# Patient Record
Sex: FEMALE | Race: WHITE | Employment: FULL TIME | URBAN - METROPOLITAN AREA
[De-identification: names, ages, dates, MRNs, and addresses within clinical notes are randomized per-mention and may not be internally consistent; named-entity substitution may affect disease eponyms.]

---

## 2018-12-01 ENCOUNTER — HOSPITAL ENCOUNTER (OUTPATIENT)
Dept: MRI IMAGING | Age: 63
Discharge: HOME | End: 2018-12-03
Payer: COMMERCIAL

## 2018-12-01 DIAGNOSIS — M25.562 LEFT KNEE PAIN, UNSPECIFIED CHRONICITY: ICD-10-CM

## 2018-12-01 PROCEDURE — 73721 MRI JNT OF LWR EXTRE W/O DYE: CPT

## 2023-07-12 LAB
APPEARANCE, URINE: NORMAL
ASCORBIC ACID: NORMAL MG/DL
BILIRUBIN, URINE: NORMAL
BLOOD, URINE: NORMAL
COLOR, URINE: NORMAL
GLUCOSE, URINE: NORMAL
KETONES, URINE: NORMAL
LEUKOCYTE ESTERASE, URINE: NORMAL
NITRITE, URINE: NORMAL
PH, URINE: NORMAL
PROTEIN, URINE: NORMAL
SPECIFIC GRAVITY, URINE: NORMAL
URINE CULTURE: NORMAL
UROBILINOGEN, URINE: NORMAL

## 2023-07-14 LAB
APPEARANCE, URINE: CLEAR
BILIRUBIN, URINE: NEGATIVE
BLOOD, URINE: NEGATIVE
COLOR, URINE: YELLOW
GLUCOSE, URINE: NEGATIVE MG/DL
KETONES, URINE: NEGATIVE MG/DL
LEUKOCYTE ESTERASE, URINE: NEGATIVE
NITRITE, URINE: NEGATIVE
PH, URINE: 5 (ref 5–8)
PROTEIN, URINE: NEGATIVE MG/DL
SPECIFIC GRAVITY, URINE: 1.01 (ref 1–1.03)
UROBILINOGEN, URINE: <2 MG/DL (ref 0–1.9)

## 2023-07-16 LAB — URINE CULTURE: NO GROWTH

## 2023-07-19 LAB
EGFR FEMALE: 55 ML/MIN/1.73M2
POC CREATININE: 1.1 MG/DL (ref 0.6–1.3)

## 2023-08-15 LAB
ANION GAP IN SER/PLAS: 12 MMOL/L (ref 10–20)
ANION GAP SERPL CALCULATED.3IONS-SCNC: 12 MMOL/L (ref 10–20)
BICARBONATE: 28 MMOL/L (ref 21–32)
CALCIUM (MG/DL) IN SER/PLAS: 10.2 MG/DL (ref 8.6–10.3)
CALCIUM SERPL-MCNC: 10.2 MG/DL (ref 8.6–10.3)
CARBON DIOXIDE, TOTAL (MMOL/L) IN SER/PLAS: 28 MMOL/L (ref 21–32)
CHLORIDE (MMOL/L) IN SER/PLAS: 102 MMOL/L (ref 98–107)
CHLORIDE BLD-SCNC: 102 MMOL/L (ref 98–107)
CREAT SERPL-MCNC: 1.05 MG/DL (ref 0.5–1.05)
CREATININE (MG/DL) IN SER/PLAS: 1.05 MG/DL (ref 0.5–1.05)
EGFR FEMALE: 58 ML/MIN/1.73M2
ERYTHROCYTE DISTRIBUTION WIDTH (RATIO) BY AUTOMATED COUNT: 13.8 % (ref 11.5–14.5)
ERYTHROCYTE MEAN CORPUSCULAR HEMOGLOBIN CONCENTRATION (G/DL) BY AUTOMATED: 32 G/DL (ref 32–36)
ERYTHROCYTE MEAN CORPUSCULAR VOLUME (FL) BY AUTOMATED COUNT: 94 FL (ref 80–100)
ERYTHROCYTE [DISTWIDTH] IN BLOOD BY AUTOMATED COUNT: 13.8 % (ref 11.5–14.5)
ERYTHROCYTES (10*6/UL) IN BLOOD BY AUTOMATED COUNT: 4.1 X10E12/L (ref 4–5.2)
GFR FEMALE: 58 ML/MIN/1.73M2
GLUCOSE (MG/DL) IN SER/PLAS: 142 MG/DL (ref 74–99)
GLUCOSE: 142 MG/DL (ref 74–99)
HCT VFR BLD CALC: 38.4 % (ref 36–46)
HEMATOCRIT (%) IN BLOOD BY AUTOMATED COUNT: 38.4 % (ref 36–46)
HEMOGLOBIN (G/DL) IN BLOOD: 12.3 G/DL (ref 12–16)
HEMOGLOBIN: 12.3 G/DL (ref 12–16)
LEUKOCYTES (10*3/UL) IN BLOOD BY AUTOMATED COUNT: 6.5 X10E9/L (ref 4.4–11.3)
MCHC RBC AUTO-ENTMCNC: 32 G/DL (ref 32–36)
MCV RBC AUTO: 94 FL (ref 80–100)
PLATELET # BLD: 213 X10E9/L (ref 150–450)
PLATELETS (10*3/UL) IN BLOOD AUTOMATED COUNT: 213 X10E9/L (ref 150–450)
POTASSIUM (MMOL/L) IN SER/PLAS: 3.9 MMOL/L (ref 3.5–5.3)
POTASSIUM SERPL-SCNC: 3.9 MMOL/L (ref 3.5–5.3)
RBC # BLD: 4.1 X10E12/L (ref 4–5.2)
SODIUM (MMOL/L) IN SER/PLAS: 138 MMOL/L (ref 136–145)
SODIUM BLD-SCNC: 138 MMOL/L (ref 136–145)
UREA NITROGEN (MG/DL) IN SER/PLAS: 18 MG/DL (ref 6–23)
UREA NITROGEN: 18 MG/DL (ref 6–23)
WBC: 6.5 X10E9/L (ref 4.4–11.3)

## 2023-09-21 PROBLEM — I10 BENIGN ESSENTIAL HYPERTENSION: Status: ACTIVE | Noted: 2023-09-21

## 2023-09-21 PROBLEM — E11.9 DIABETES MELLITUS (MULTI): Status: ACTIVE | Noted: 2023-09-21

## 2023-09-21 PROBLEM — E83.42 HYPOMAGNESEMIA: Status: ACTIVE | Noted: 2023-09-21

## 2023-09-21 PROBLEM — R93.1 ABNORMAL COMPUTED TOMOGRAPHY ANGIOGRAPHY OF HEART: Status: ACTIVE | Noted: 2023-09-21

## 2023-09-21 PROBLEM — R06.02 SOB (SHORTNESS OF BREATH): Status: ACTIVE | Noted: 2023-09-21

## 2023-09-21 PROBLEM — R91.1 PULMONARY NODULE: Status: ACTIVE | Noted: 2023-09-21

## 2023-09-21 PROBLEM — R82.89 ABNORMAL URINE CYTOLOGY: Status: ACTIVE | Noted: 2023-09-21

## 2023-09-21 PROBLEM — N20.0 NEPHROLITHIASIS: Status: ACTIVE | Noted: 2023-09-21

## 2023-09-21 PROBLEM — R20.2 NUMBNESS AND TINGLING: Status: ACTIVE | Noted: 2023-09-21

## 2023-09-21 PROBLEM — M54.9 DORSODYNIA: Status: ACTIVE | Noted: 2023-09-21

## 2023-09-21 PROBLEM — M54.12 CERVICAL RADICULOPATHY: Status: ACTIVE | Noted: 2023-09-21

## 2023-09-21 PROBLEM — N39.46 URGE AND STRESS INCONTINENCE: Status: ACTIVE | Noted: 2023-09-21

## 2023-09-21 PROBLEM — I27.20 PULMONARY HTN (MULTI): Status: ACTIVE | Noted: 2023-09-21

## 2023-09-21 PROBLEM — R94.39 ABNORMAL CARDIOVASCULAR STRESS TEST: Status: ACTIVE | Noted: 2023-09-21

## 2023-09-21 PROBLEM — R07.9 CHEST PAIN: Status: ACTIVE | Noted: 2023-09-21

## 2023-09-21 PROBLEM — D30.00 BENIGN RENAL TUMOR: Status: ACTIVE | Noted: 2023-09-21

## 2023-09-21 PROBLEM — D17.9 ANGIOMYOLIPOMA: Status: ACTIVE | Noted: 2023-09-21

## 2023-09-21 PROBLEM — K58.9 IBS (IRRITABLE BOWEL SYNDROME): Status: ACTIVE | Noted: 2023-09-21

## 2023-09-21 PROBLEM — E66.01 MORBID OBESITY WITH BMI OF 40.0-44.9, ADULT (MULTI): Status: ACTIVE | Noted: 2023-09-21

## 2023-09-21 PROBLEM — R79.89 ELEVATED TROPONIN: Status: ACTIVE | Noted: 2023-09-21

## 2023-09-21 PROBLEM — I82.409 DVT (DEEP VENOUS THROMBOSIS) (MULTI): Status: ACTIVE | Noted: 2023-09-21

## 2023-09-21 PROBLEM — R20.0 NUMBNESS AND TINGLING: Status: ACTIVE | Noted: 2023-09-21

## 2023-09-21 RX ORDER — ALBUTEROL SULFATE 90 UG/1
2 AEROSOL, METERED RESPIRATORY (INHALATION) EVERY 4 HOURS PRN
COMMUNITY
Start: 2014-08-09

## 2023-09-21 RX ORDER — CHOLECALCIFEROL (VITAMIN D3) 25 MCG
1 TABLET ORAL DAILY
COMMUNITY

## 2023-09-21 RX ORDER — LOSARTAN POTASSIUM 50 MG/1
1 TABLET ORAL DAILY
COMMUNITY

## 2023-09-21 RX ORDER — CHOLECALCIFEROL (VITAMIN D3) 125 MCG
125 CAPSULE ORAL DAILY
COMMUNITY

## 2023-09-21 RX ORDER — NITROGLYCERIN 0.4 MG/1
0.4 TABLET SUBLINGUAL EVERY 5 MIN PRN
COMMUNITY

## 2023-09-21 RX ORDER — DICYCLOMINE HYDROCHLORIDE 10 MG/1
10 CAPSULE ORAL 4 TIMES DAILY PRN
COMMUNITY

## 2023-09-21 RX ORDER — ASCORBIC ACID 500 MG
1 TABLET ORAL DAILY
COMMUNITY

## 2023-09-21 RX ORDER — DULOXETIN HYDROCHLORIDE 30 MG/1
1 CAPSULE, DELAYED RELEASE ORAL DAILY
COMMUNITY
Start: 2021-06-17

## 2023-09-21 RX ORDER — LOSARTAN POTASSIUM AND HYDROCHLOROTHIAZIDE 12.5; 5 MG/1; MG/1
1 TABLET ORAL DAILY
COMMUNITY
Start: 2014-07-23

## 2023-09-21 RX ORDER — GABAPENTIN 300 MG/1
600 CAPSULE ORAL NIGHTLY
COMMUNITY

## 2023-09-21 RX ORDER — DULAGLUTIDE 1.5 MG/.5ML
1 INJECTION, SOLUTION SUBCUTANEOUS
COMMUNITY

## 2023-09-21 RX ORDER — HYDROCODONE BITARTRATE AND ACETAMINOPHEN 5; 300 MG/1; MG/1
1 TABLET ORAL EVERY 6 HOURS PRN
COMMUNITY

## 2023-09-21 RX ORDER — APIXABAN 5 MG (74)
1 KIT ORAL 2 TIMES DAILY
COMMUNITY
Start: 2020-07-15

## 2023-09-21 RX ORDER — PNV NO.95/FERROUS FUM/FOLIC AC 28MG-0.8MG
100 TABLET ORAL DAILY
COMMUNITY

## 2023-09-21 RX ORDER — TIRZEPATIDE 2.5 MG/.5ML
INJECTION, SOLUTION SUBCUTANEOUS
COMMUNITY

## 2023-09-21 RX ORDER — ACETAMINOPHEN 500 MG
50 TABLET ORAL DAILY
COMMUNITY

## 2023-09-21 RX ORDER — ALLOPURINOL 300 MG/1
1 TABLET ORAL DAILY
COMMUNITY
Start: 2015-06-29 | End: 2023-11-28 | Stop reason: SDUPTHER

## 2023-09-21 RX ORDER — MONTELUKAST SODIUM 4 MG/1
1 TABLET, CHEWABLE ORAL 2 TIMES DAILY PRN
COMMUNITY

## 2023-09-21 RX ORDER — MELOXICAM 7.5 MG/1
1 TABLET ORAL DAILY
COMMUNITY
Start: 2014-08-09

## 2023-09-21 RX ORDER — CYCLOBENZAPRINE HCL 10 MG
.5-1 TABLET ORAL NIGHTLY PRN
COMMUNITY
Start: 2020-08-12

## 2023-09-21 RX ORDER — ASPIRIN 81 MG/1
81 TABLET ORAL DAILY
COMMUNITY

## 2023-09-21 RX ORDER — METFORMIN HYDROCHLORIDE 500 MG/1
500 TABLET, EXTENDED RELEASE ORAL 2 TIMES DAILY
COMMUNITY

## 2023-09-22 ENCOUNTER — HOSPITAL ENCOUNTER (OUTPATIENT)
Dept: DATA CONVERSION | Facility: HOSPITAL | Age: 68
End: 2023-09-22
Attending: INTERNAL MEDICINE | Admitting: INTERNAL MEDICINE
Payer: MEDICARE

## 2023-09-22 DIAGNOSIS — Z86.718 PERSONAL HISTORY OF OTHER VENOUS THROMBOSIS AND EMBOLISM: ICD-10-CM

## 2023-09-22 DIAGNOSIS — I25.118 ATHEROSCLEROTIC HEART DISEASE OF NATIVE CORONARY ARTERY WITH OTHER FORMS OF ANGINA PECTORIS (CMS-HCC): ICD-10-CM

## 2023-09-22 DIAGNOSIS — I25.10 ATHEROSCLEROTIC HEART DISEASE OF NATIVE CORONARY ARTERY WITHOUT ANGINA PECTORIS: ICD-10-CM

## 2023-09-22 DIAGNOSIS — E11.9 TYPE 2 DIABETES MELLITUS WITHOUT COMPLICATIONS (MULTI): ICD-10-CM

## 2023-09-22 DIAGNOSIS — K58.9 IRRITABLE BOWEL SYNDROME WITHOUT DIARRHEA: ICD-10-CM

## 2023-09-22 DIAGNOSIS — R07.9 CHEST PAIN, UNSPECIFIED: ICD-10-CM

## 2023-09-22 DIAGNOSIS — I10 ESSENTIAL (PRIMARY) HYPERTENSION: ICD-10-CM

## 2023-09-22 DIAGNOSIS — E66.9 OBESITY, UNSPECIFIED: ICD-10-CM

## 2023-09-22 DIAGNOSIS — R93.1 ABNORMAL FINDINGS ON DIAGNOSTIC IMAGING OF HEART AND CORONARY CIRCULATION: ICD-10-CM

## 2023-09-22 LAB
ACTIVATED PARTIAL THROMBOPLASTIN TIME IN PPP BY COAGULATION ASSAY: 46 SEC (ref 27–38)
ALANINE AMINOTRANSFERASE (SGPT) (U/L) IN SER/PLAS: 21 U/L (ref 7–45)
ALBUMIN (G/DL) IN SER/PLAS: 3.7 G/DL (ref 3.4–5)
ALKALINE PHOSPHATASE (U/L) IN SER/PLAS: 61 U/L (ref 33–136)
ANION GAP IN SER/PLAS: 11 MMOL/L (ref 10–20)
ANION GAP IN SER/PLAS: 13 MMOL/L (ref 10–20)
ANION GAP SERPL CALCULATED.3IONS-SCNC: 13 MMOL/L (ref 10–20)
ASPARTATE AMINOTRANSFERASE (SGOT) (U/L) IN SER/PLAS: 20 U/L (ref 9–39)
BICARBONATE: 23 MMOL/L (ref 21–32)
BILIRUBIN TOTAL (MG/DL) IN SER/PLAS: 0.6 MG/DL (ref 0–1.2)
CALCIUM (MG/DL) IN SER/PLAS: 10 MG/DL (ref 8.6–10.3)
CALCIUM (MG/DL) IN SER/PLAS: 9.6 MG/DL (ref 8.6–10.3)
CALCIUM SERPL-MCNC: 10 MG/DL (ref 8.6–10.3)
CARBON DIOXIDE, TOTAL (MMOL/L) IN SER/PLAS: 23 MMOL/L (ref 21–32)
CARBON DIOXIDE, TOTAL (MMOL/L) IN SER/PLAS: 25 MMOL/L (ref 21–32)
CHLORIDE (MMOL/L) IN SER/PLAS: 103 MMOL/L (ref 98–107)
CHLORIDE (MMOL/L) IN SER/PLAS: 105 MMOL/L (ref 98–107)
CHLORIDE BLD-SCNC: 103 MMOL/L (ref 98–107)
CHOLESTEROL (MG/DL) IN SER/PLAS: 112 MG/DL (ref 0–199)
CHOLESTEROL IN HDL (MG/DL) IN SER/PLAS: 39 MG/DL
CHOLESTEROL/HDL RATIO: 2.9
CREAT SERPL-MCNC: 1.14 MG/DL (ref 0.5–1.05)
CREATININE (MG/DL) IN SER/PLAS: 1.07 MG/DL (ref 0.5–1.05)
CREATININE (MG/DL) IN SER/PLAS: 1.14 MG/DL (ref 0.5–1.05)
EGFR FEMALE: 53 ML/MIN/1.73M2
ERYTHROCYTE DISTRIBUTION WIDTH (RATIO) BY AUTOMATED COUNT: 14 % (ref 11.5–14.5)
ERYTHROCYTE DISTRIBUTION WIDTH (RATIO) BY AUTOMATED COUNT: 14 % (ref 11.5–14.5)
ERYTHROCYTE MEAN CORPUSCULAR HEMOGLOBIN CONCENTRATION (G/DL) BY AUTOMATED: 33.1 G/DL (ref 32–36)
ERYTHROCYTE MEAN CORPUSCULAR HEMOGLOBIN CONCENTRATION (G/DL) BY AUTOMATED: 33.6 G/DL (ref 32–36)
ERYTHROCYTE MEAN CORPUSCULAR VOLUME (FL) BY AUTOMATED COUNT: 90 FL (ref 80–100)
ERYTHROCYTE MEAN CORPUSCULAR VOLUME (FL) BY AUTOMATED COUNT: 91 FL (ref 80–100)
ERYTHROCYTE [DISTWIDTH] IN BLOOD BY AUTOMATED COUNT: 14 % (ref 11.5–14.5)
ERYTHROCYTES (10*6/UL) IN BLOOD BY AUTOMATED COUNT: 3.94 X10E12/L (ref 4–5.2)
ERYTHROCYTES (10*6/UL) IN BLOOD BY AUTOMATED COUNT: 4.27 X10E12/L (ref 4–5.2)
ESTIMATED AVERAGE GLUCOSE FOR HBA1C: 157 MG/DL
GFR FEMALE: 53 ML/MIN/1.73M2
GFR FEMALE: 57 ML/MIN/1.73M2
GLUCOSE (MG/DL) IN SER/PLAS: 129 MG/DL (ref 74–99)
GLUCOSE (MG/DL) IN SER/PLAS: 133 MG/DL (ref 74–99)
GLUCOSE: 133 MG/DL (ref 74–99)
HCT VFR BLD CALC: 38.4 % (ref 36–46)
HEMATOCRIT (%) IN BLOOD BY AUTOMATED COUNT: 35.9 % (ref 36–46)
HEMATOCRIT (%) IN BLOOD BY AUTOMATED COUNT: 38.4 % (ref 36–46)
HEMOGLOBIN (G/DL) IN BLOOD: 11.9 G/DL (ref 12–16)
HEMOGLOBIN (G/DL) IN BLOOD: 12.9 G/DL (ref 12–16)
HEMOGLOBIN A1C/HEMOGLOBIN TOTAL IN BLOOD: 7.1 %
HEMOGLOBIN: 12.9 G/DL (ref 12–16)
INR IN PPP BY COAGULATION ASSAY: 1.1 (ref 0.9–1.1)
LDL: 39 MG/DL (ref 0–99)
LEUKOCYTES (10*3/UL) IN BLOOD BY AUTOMATED COUNT: 4.6 X10E9/L (ref 4.4–11.3)
LEUKOCYTES (10*3/UL) IN BLOOD BY AUTOMATED COUNT: 5 X10E9/L (ref 4.4–11.3)
MAGNESIUM (MG/DL) IN SER/PLAS: 1.59 MG/DL (ref 1.6–2.4)
MCHC RBC AUTO-ENTMCNC: 33.6 G/DL (ref 32–36)
MCV RBC AUTO: 90 FL (ref 80–100)
PLATELET # BLD: 205 X10E9/L (ref 150–450)
PLATELETS (10*3/UL) IN BLOOD AUTOMATED COUNT: 188 X10E9/L (ref 150–450)
PLATELETS (10*3/UL) IN BLOOD AUTOMATED COUNT: 205 X10E9/L (ref 150–450)
POTASSIUM (MMOL/L) IN SER/PLAS: 3.6 MMOL/L (ref 3.5–5.3)
POTASSIUM (MMOL/L) IN SER/PLAS: 3.7 MMOL/L (ref 3.5–5.3)
POTASSIUM SERPL-SCNC: 3.7 MMOL/L (ref 3.5–5.3)
PROTEIN TOTAL: 6.6 G/DL (ref 6.4–8.2)
PROTHROMBIN TIME (PT) IN PPP BY COAGULATION ASSAY: 12.1 SEC (ref 9.8–12.8)
RBC # BLD: 4.27 X10E12/L (ref 4–5.2)
SODIUM (MMOL/L) IN SER/PLAS: 135 MMOL/L (ref 136–145)
SODIUM (MMOL/L) IN SER/PLAS: 137 MMOL/L (ref 136–145)
SODIUM BLD-SCNC: 135 MMOL/L (ref 136–145)
TRIGLYCERIDE (MG/DL) IN SER/PLAS: 170 MG/DL (ref 0–149)
UREA NITROGEN (MG/DL) IN SER/PLAS: 25 MG/DL (ref 6–23)
UREA NITROGEN (MG/DL) IN SER/PLAS: 28 MG/DL (ref 6–23)
UREA NITROGEN: 28 MG/DL (ref 6–23)
VLDL: 34 MG/DL (ref 0–40)
WBC: 5 X10E9/L (ref 4.4–11.3)

## 2023-09-24 LAB — STAPH/MRSA SCREEN, CULTURE: NORMAL

## 2023-09-29 VITALS — BODY MASS INDEX: 40.76 KG/M2 | WEIGHT: 259.7 LBS | HEIGHT: 67 IN

## 2023-09-30 NOTE — H&P
History of Present Illness:   Admission Reason: Abnormal nuclear stress test/chest  pain/dyspnea   HPI:    GARY TRIANA is a 68 year old Female with past medical history significant for HTN, DM 2, morbid obesity, DVT, irritable bowel syndrome, pulmonary hypertension with  recent complaints of chest pain and shortness of breath with minimal exertion.  Patient underwent nuclear stress test which was abnormal showing lateral ischemia.  It was recommended she undergo LHC/possible PCI to further evaluate abnormal findings.   She is at [facility] today for this procedure under the care of Dr. Hernandez.    Patient denies complaints of recent illness, fevers, chills, sweats or exposure to COVID-19.  Denies recent complaints of lightheadedness, dizziness, chest pain, shortness of breath or palpitations at rest.  She does complain of chest pain and shortness  of breath with exertion.  Denies complaints of nausea, vomiting, diarrhea or constipation.  Denies complaints of lower extremity edema or weakness.    Comorbidities:   Comorbidites:  ·  Comorbid Conditions diabetes, hypertension   ·  Diabetes Type Type 2   ·  Insulin Dependent no   ·  DM Acuity or Status unknown   ·  DM Complications unknown     Past Medical/Surgical History:        Medical History:   Cervical radiculopathy, chronic:    IBS (irritable bowel syndrome):    Nephrolithiasis, uric acid:    Angiomyolipoma:    Chronic edema:    Diabetes mellitus:    Hyperlipidemia:    Hypertension:        Surg History:   History of laparoscopic cholecystectomy:    History of  section:    History of knee replacement:     Family History:   CAD: yes  Father, mother   Hypertension: yes  Brother   Stroke: yes  Mother     Social History:   Social History:  Smoking Status never smoker   Alcohol Use denies   Drug Use denies   Social History                 Allergies:  ·  lisinopril : Other    Medications Prior to Admission:   Admission Medication  Reconciliation has not been completed for this patient.    Review of Systems:   Constitutional: NEGATIVE: Fever, Chills, Anorexia,  Weight Loss, Malaise     Eyes: NEGATIVE: Blurry Vision, Drainage, Diploplia,  Redness, Vision Loss/ Change     ENMT: NEGATIVE: Nasal Discharge, Nasal Congestion,  Ear Pain, Mouth Pain, Throat Pain     Respiratory: POSITIVE: Shortness of Breath; NEGATIVE:  Dry Cough, Productive Cough, Hemoptysis, Wheezing     Cardiac: POSITIVE: Chest Pain, Dyspnea on Exertion ; NEGATIVE: Orthopnea, Palpitations, Syncope     Gastrointestinal: NEGATIVE: Nausea, Vomiting, Diarrhea,  Constipation, Abdominal Pain     Musculoskeletal: NEGATIVE: Decreased ROM, Pain, Swelling,  Stiffness, Weakness     Neurological: NEGATIVE: Dizziness, Confusion, Headache,  Seizures, Syncope     Skin: NEGATIVE: Mass, Pain, Pruritus, Rash, Ulcer     All Other Systems: All other systems reviewed and  are negative     Objective:     Objective Information:    Vital signs reviewed and are stable.       Weights   9/22 7:13: Weight in kg (Weight (kg))  117.8  9/22 7:13: Weight in lbs ((lbs))  259.7  9/22 7:13: BMI (kg/m2) (BMI (kg/m2))  40.713    Physical Exam by System:    Constitutional: Well developed, awake/alert/oriented  x3, no distress, alert and cooperative   Eyes: PERRL, EOMI, clear sclera   ENMT: mucous membranes moist, no apparent injury,  no lesions seen   Head/Neck: Neck supple, no apparent injury, thyroid  without mass or tenderness, No JVD, trachea midline, no bruits   Respiratory/Thorax: Patent airways, CTAB, normal  breath sounds with good chest expansion, thorax symmetric   Cardiovascular: Regular, rate and rhythm, no murmurs,  2+ equal pulses of the extremities, normal S 1and S 2   Gastrointestinal: Nondistended, soft, non-tender,  no rebound tenderness or guarding, no masses palpable, no organomegaly, +BS, no bruits   Genitourinary: Deferred   Musculoskeletal: ROM intact, no joint swelling, normal  strength    Extremities: normal extremities, no edema, contusions  or wounds, no clubbing   Neurological: alert and oriented x3, intact senses,  motor, response and reflexes, normal strength   Breast: Deferred   Psychological: Appropriate mood and behavior   Skin: Warm and dry, no lesions, no rashes     Medications:    Medications:    I have reviewed active medication orders.    Recent Lab Results:    Results:        I have reviewed these laboratory results:    Complete Blood Count  22-Sep-2023 07:30:00      Result Value    White Blood Cell Count  5.0    Red Blood Cell Count  4.27    HGB  12.9    HCT  38.4    MCV  90    MCHC  33.6    PLT  205    RDW-CV  14.0      Basic Metabolic Panel  22-Sep-2023 07:30:00      Result Value    Glucose, Serum  133   H   NA  135   L   K  3.7    CL  103    Bicarbonate, Serum  23    Anion Gap, Serum  13    BUN  28   H   CREAT  1.14   H   GFR Female  53   A   Calcium, Serum  10.0        Radiology Results:    Results:        Impression:  Old calcified granulomatous disease of the chest.     4-5 mm right middle lobe nodule with equivocal central calcification.  There is a 3 x 5 mm subpleural density in the right middle lobe  without calcification. There are 2 punctate densities in the left  lung. These findings are stable from a CT examination of 07/31/2020  favoring benign etiology.     The remaining extra vascular structures are unremarkable.       Addendum Ends  MRN: [id number]  Patient Name: GARY TRIANA     STUDY:  CTA CORONARY ART WITH HEARTFLOW IF SCORE >30%.;  9/12/2023 10:33 am     INDICATION:  Chest pain dyspnea  R06.02: SOB (shortness of breath) R07.9: Chest  pain.     COMPARISON:  None.     ACCESSION NUMBER(S):  [id number]     ORDERING CLINICIAN:  EMMA ANGELA     TECHNIQUE:  Using multi-detector CT technology, Ruth 64-slice scanner, axial,  sequential imaging with retrospective gating was performed of the  chest following the intravenous administration of contrast material.  A  low-osmolar contrast agent was used 75 ml of Omnipaque 350.  Using prospective ECG gating, CT scan of the coronary arteries was  performed without intravenous contrast. Coronary calcium scoring was  performed according to the method of Agatston.     In addition, CT-FFR analysis was also performed.      The patient was premedicated with atenolol and 0.4 mg sublingual  nitroglycerin per protocol for heart rate control and coronary  dilation, respectively.     For optimization of anatomic evaluation, multiplanar reconstruction,  maximum intensity projections, and advanced 3-D off-line  postprocessing were performed on a dedicated stand-alone workstation  under the direct supervision of the interpreting physician.     Independent FFR analysis of Heartflow 3D model was performed by  interpreting physician.        CT Dose-Length Product (DLP):  1105.2 mGy/cm  CT Dose Reduction Employed: Yes iterative reconstruction     FINDINGS:  The left main is normal sized vessel that  bifurcates into the LAD  and circumflex.     Distal calcified plaque without significant stenosis.     LEFT ANTERIOR DESCENDING ARTERY:  The LAD is a normal size vessel that  wraps around the apex.  Proximal mixed plaque with 50% stenosis. Proximal FFR 0.96  (hemodynamically nonsignificant). Mid vessel noncalcified plaque with  significant stenosis. Mid LAD FFR 0.57 (hemodynamically significant.  LAD gives rise to 1 acute diagonal branches.  D1: Proximal calcified plaque without significant stenosis. Mid  vessel mixed plaque with 50% stenosis.     LEFT CIRCUMFLEX ARTERY:  The LCfx is a normal size vessel, which is  non-dominant.  Proximal calcified plaque without significant stenosis.  LCfx gives rise to 2 obtuse marginal branches.  OM1 proximal noncalcified plaque without significant stenosis.  OM2 proximal mixed plaque with 50% stenosis. Mid vessel mixed plaque  with significant stenosis. Mid OM2 FFR 0.61 (hemodynamically  significant).     RIGHT  CORONARY ARTERY:  The RCA is a normal size vessel, which is  dominant .     It gives rise to a conus branch, sixto branch, and 1 acute marginal  branches.  In its distal segment it bifurcates into the PDA and PV  branch.     Proximal mixed plaque without significant stenosis. Mid vessel  noncalcified plaque with 50% stenosis. Mid RCA FFR 0.86  (hemodynamically nonsignificant). Distal mixed plaque with 50-70%  stenosis. Distal FFR 0.69 (hemodynamically significant.     Right PDA proximal mixed plaque without significant stenosis.  Right PLV proximal noncalcified plaque without significant stenosis     Coronary artery calcium score 510, 95th percentile for age, gender,  and race in asymptomatic patients.     CARDIAC CHAMBERS:  The cardiac chambers demonstrate normal atrioventricular and  ventriculoarterial concordance, and systemic and pulmonary venous  return.     LEFT VENTRICLE:  Normal size End diastolic volume 157 ml, 65 ml/m2     LEFT VENTRICLE MASS:  136 gm, 57 gm/m2     RIGHT VENTRICLE:  Normal size End diastolic volume 159 ml, 66 ml/m2     LEFT ATRIUM:  Normal size End systolic volume 108 ml, 45 ml/m2     RIGHT ATRIUM:  Normal size End systolic volume 134 ml, 56 ml/m2     INTERATRIAL SEPTUM:  Intact.     AORTIC VALVE:  The aortic valve is  trileaflet in morphology. No calcifications.     MITRAL VALVE:  No thickening/calcification.     THORACICAORTA:  The visualized thoracic aorta is normal in course, caliber, and  contour.     There is no acute aortic pathology, such as dissection, intramural  hematoma, or contained rupture.     The aortic arch is not included on this examination.     PERICARDIUM:  There is no pericardial effusion of thickening.        FRACTIONAL FLOW RESERVE     LEFT ANTERIOR DESCENDING: Proximal 0.96, mid 0.57, distal 0.52  D1: Proximal 0.92     LEFT CIRCUMFLEX: Proximal 1.0.     OM2: Proximal 0.99, mid 0.61     RIGHT CORONARY ARTERY: Proximal 0.98, mid 0.86, distal 0.69  PDA: 0.68  PLV:  0.69        IMPRESSION:  1. Significant stenosis of the mid left anterior descending coronary  artery with noncalcified plaque. FFR 0.57 (hemodynamically  significant).  2. Significant stenosis of mid OM2 with mixed plaque. FFR 0.61  (hemodynamically significant).  3. Distal dominant right coronary artery mixed plaque with 50-70%  stenosis. Distal RCA FFR 0.69 (hemodynamically significant).  4. Mild-moderate diffuse coronaryartery disease of the remaining  coronary arteries without significant stenosis.  5. Coronary artery calcium score 510, 95th percentile for age,  gender, and race in asymptomatic patients.     Reading Cardiologist: Dr. Tony Gardner, Date:  9/12/2023  12:39 pm        ADDENDUM:  Technical: The following is to serve as an over-read for a  contrast-enhanced cardiac CT, to evaluate the extra vascular  structures.     Contiguous axial CT sections are performed from the level of the  shanel to the upper abdomen following the bolus administration of 80  cc of intravenous Omnipaque 350.           Findings: There is a 9 mm calcified granuloma in the right middle  lobe. There is an adjacent scratch at nodule measuring 4-5 mm in the  right middle lobe (image 40). Faint central calcification can not be  excluded. There are least 2 punctate densities in the left lower  lobe. There is a 5 x 3 mm subpleural density anteriorly in the right  middle lobe.     There are calcified right hilar lymph nodes. There is no sign of  pathologic lymph node enlargement. There is no pericardial or pleural  effusion.     Images through the upper abdomen are unremarkable aside from  calcified granuloma in the spleen..     The visualized osseous and soft tissue structures of the chest wall  are intact.           Impression: Old calcified granulomatous disease of the chest.     4-5 mm right middle lobe nodule with equivocal central calcification.  There is a 3 x 5 mm subpleural density in the right middle lobe  without  calcification. There are 2 punctate densities in the left  lung. These findings are stable from a CT examination of 07/31/2020  favoring benign etiology.     The remaining extra vascular structures are unremarkable.        CTA Coronary Arteries with Heartflow if Score >30% [Sep 12 2023 12:58PM]      Impression:    Abnormal Lexiscan Myoview cardiac perfusion stress test.  No evidence of ischemia or myocardial infarction by perfusion imaging.  Moderate lateral wall fixed perfusion defect with normal wall motion  suggesting attenuation artifact.  Normal left ventricular systolic function, ejection fraction 54%.  Abnormal resting electrocardiogram is noted.  No comparison study available.  Clinical correlation is advised.        NM Cardiac Stress/Rest Nuclear Med Order [Aug  8 2023  1:48PM]      Assessment and Plan:     Impression 1: Abnormal nuclear stress test/chest  pain/dyspnea   Plan for Impression 1: Showing lateral ischemia.    LHC/possible PCI today under the care of Dr. Hernandez   Impression 2: Benign essential hypertension   Plan for Impression 2: Stable   Impression 3: Diabetes mellitus 2   Impression 4: Morbid obesity   Plan for Impression 4: BMI 42.1   Impression 5: Pulmonary hypertension   Impression 6: History of DVT       Electronic Signatures:  Shelby De La Cruz (APRN-CNP)  (Signed 22-Sep-2023 08:28)   Authored: History of Present Illness, Comorbidities,  Past Medical/Surgical History, Family History, Social History, Allergies, Medications Prior to Admission, Review of Systems, Objective, Assessment and Plan, Note Completion      Last Updated: 22-Sep-2023 08:28 by Shelby De La Cruz (APRN-CNP)

## 2023-09-30 NOTE — H&P
History & Physical Reviewed:   I have reviewed the History and Physical dated:  22-Sep-2023   History and Physical reviewed and relevant findings noted. Patient examined to review pertinent physical  findings.: No significant changes   Home Medications Reviewed: no changes noted   Allergies Reviewed: no changes noted       Airway/Sedation Assessment:  ·  Oropharyngeal Classification Class II   ·  ASA PS Classification ASA II   ·  Sedation Plan moderate sedation       ERAS (Enhanced Recovery After Surgery):  ·  ERAS Patient: no     Consent:   COVID-19 Consent:  ·  COVID-19 Risk Consent Surgeon has reviewed key risks related to the risk of jayashree COVID-19 and if they contract COVID-19 what the risks are.       Electronic Signatures:  Nathan Hernandez)  (Signed 22-Sep-2023 11:26)   Authored: History & Physical Reviewed, Airway/Sedation,  ERAS, Consent, Note Completion      Last Updated: 22-Sep-2023 11:26 by Nathan Hernandez)

## 2023-10-02 LAB
ATRIAL RATE: 64 BPM
P AXIS: 38 DEGREES
P OFFSET: 181 MS
P ONSET: 121 MS
PR INTERVAL: 194 MS
Q ONSET: 218 MS
QRS COUNT: 10 BEATS
QRS DURATION: 86 MS
QT INTERVAL: 432 MS
QTC CALCULATION(BAZETT): 445 MS
QTC FREDERICIA: 441 MS
R AXIS: 33 DEGREES
T AXIS: 13 DEGREES
T OFFSET: 434 MS
VENTRICULAR RATE: 64 BPM

## 2023-10-03 ENCOUNTER — APPOINTMENT (OUTPATIENT)
Dept: CARDIOLOGY | Facility: CLINIC | Age: 68
End: 2023-10-03
Payer: MEDICARE

## 2023-10-23 ENCOUNTER — APPOINTMENT (OUTPATIENT)
Dept: CARDIOLOGY | Facility: CLINIC | Age: 68
End: 2023-10-23
Payer: MEDICARE

## 2023-11-28 DIAGNOSIS — N20.0 NEPHROLITHIASIS: Primary | ICD-10-CM

## 2023-11-28 RX ORDER — ALLOPURINOL 300 MG/1
300 TABLET ORAL DAILY
Qty: 90 TABLET | Refills: 3 | Status: SHIPPED | OUTPATIENT
Start: 2023-11-28

## 2024-03-06 NOTE — CONSULTS
"    Service:   Service: Cardiac Surgery     Consult:  Consult requested by (Attending Name): Nathan Hernandez   Reason: Triple Vessel CAD/Dr. Hernandez recommending  CABG     History of Present Illness:   Admission Reason: elective [facility]   HPI:    GARY TRIANA is a 68 year old Female with history of type 2 diabetes mellitus, hypertension, hyperlipidemia, chronic nephrolithiasis, venous reflux left lower extremity,  mild BLE lymphedema, bilateral DVT and PE now off anticoagulation for 1 year.  Approximately 2 months ago began to develop symptoms of exertional chest pain and dyspnea that was similar to the symptoms she experienced at the time of her PE.  Subsequent  CTA of the chest negative for PE, and was sent for a perfusion study which was notable for lateral ischemia.  She presented on 9/22 for elective left heart catheterization for further evaluation and was found to have significant multivessel coronary artery  disease with normal left main trunk, and 99% subtotal occlusion of the mid circumflex, and 90% stenosis of the proximal LAD, a 75% stenosis of the ostial first diagonal, and a 90% stenosis of the distal LAD.  Left ventriculography showing normal LV systolic  function.  Cardiac surgery has been consulted for surgical revascularization.    At time of consultation, the patient is in no distress.  She is afebrile, hemodynamically stable and maintaining sinus rhythm, saturations are adequate on room air.  No symptoms of resting dyspnea or angina.  She states that typically she experiences  these symptoms mostly when doing more strenuous activities, and symptoms are relieved with rest.  She did on 1 occasion take a sublingual nitroglycerin but has not used it since that time due to a \"head rush\" sensation.  Findings have been reviewed with  Dr. Castaneda, plan to obtain routine preoperative testing today.  Patient's symptoms consistent with stable angina, she is comfortable with being discharged to return electively " early next week for surgery.  Tentative date 9/26.    Review Family/Social History and ROS:     Review Family/Social History and ROS:       I have reviewed the family and social history and review of systems from the History and Physical.    Social History:    Smoking Status: never smoker  (1)   Alcohol Use: denies (1)   Drug Use: denies  (1)     Constitutional: NEGATIVE: Fever, Chills, Anorexia,  Weight Loss, Malaise     Eyes: NEGATIVE: Blurry Vision, Drainage, Diploplia,  Redness, Vision Loss/ Change     ENMT: NEGATIVE: Nasal Discharge, Nasal Congestion,  Ear Pain, Mouth Pain, Throat Pain     Respiratory: NEGATIVE: Dry Cough, Productive Cough,  Hemoptysis, Wheezing, Shortness of Breath     Cardiac: POSITIVE: Chest Pain, Dyspnea on Exertion ; NEGATIVE: Orthopnea, Palpitations, Syncope     Gastrointestinal: NEGATIVE: Nausea, Vomiting, Diarrhea,  Constipation, Abdominal Pain     Genitourinary: NEGATIVE: Discharge, Dysuria, Flank  Pain, Frequency, Hematuria     Musculoskeletal: POSITIVE: Swelling, Stiffness; NEGATIVE:  Decreased ROM, Pain, Weakness; COMMENTS: Mild BLE lymphedema     Neurological: NEGATIVE: Dizziness, Confusion, Headache,  Seizures, Syncope     Psychiatric: NEGATIVE: Mood Changes, Anxiety, Hallucinations,  Sleep Changes, Suicidal Ideas     Skin: NEGATIVE: Mass, Pain, Pruritus, Rash, Ulcer     Endocrine: NEGATIVE: Heat Intolerance, Cold Intolerance,  Sweat, Polyuria, Thirst     Hematologic/Lymph: NEGATIVE: Anemia, Bruising, Easy  Bleeding, Night Sweats, Petechiae     Allergic/Immunologic: NEGATIVE: Anaphylaxis, Itchy/  Teary Eyes, Itching, Sneezing, Swelling     Breast: NEGATIVE: Pain, Mass, Discharge, Nipple Itching,  Gynecomastia     All Other Systems: All other systems reviewed and  are negative            Allergies:  ·  lisinopril : Other    Objective:   Physical Exam by System:    Constitutional: Well developed, awake/alert/oriented  x3, no distress, alert and cooperative   Eyes: PERRL, EOMI, clear  sclera   ENMT: mucous membranes moist, no apparent injury,  no lesions seen   Head/Neck: Neck supple, no apparent injury, thyroid  without mass or tenderness, No JVD, trachea midline, no bruits   Respiratory/Thorax: Patent airways, CTAB, normal  breath sounds with good chest expansion, thorax symmetric   Cardiovascular: Regular, rate and rhythm, no murmurs,  2+ equal pulses of the extremities, normal S 1and S 2   Gastrointestinal: Nondistended, soft, non-tender,  no rebound tenderness or guarding, no masses palpable, no organomegaly, +BS, no bruits   Genitourinary: deferred   Musculoskeletal: Status post bilateral TKR otherwise  no deformity   Extremities: Chronic bilateral lower extremity lymphedema,  extremities well perfused   Neurological: alert and oriented x3, intact senses,  motor, response and reflexes, normal strength   Breast: deferred   Lymphatic: No significant lymphadenopathy   Psychological: Appropriate mood and behavior   Skin: Warm and dry, no lesions, no rashes     Medications:    Medications:          Continuous Medications       --------------------------------    1. Sodium Chloride 0.9% Infusion:  1000  mL  IntraVenous  <Continuous>         Scheduled Medications       --------------------------------  No scheduled medications are active         PRN Medications       --------------------------------    1. Acetaminophen:  650  mg  Oral  Every 6 Hours    2. Nitroglycerin SubLingual:  0.4  mg  SubLingual  Every 5 Minutes         Conditional Medication Orders       --------------------------------    1. Perflutren Lipid Microsphere (Activated) 1.3 mL / NaCL 0.9% T.V. 10 mL Injectable:  0.5  mL  IntraVenous Push  Once      Recent Lab Results:    Results:        I have reviewed these laboratory results:    Complete Blood Count  Trending View      Result 22-Sep-2023 14:17:00  22-Sep-2023 07:30:00    White Blood Cell Count 4.6   5.0    Red Blood Cell Count 3.94   L   4.27    HGB 11.9   L   12.9    HCT  35.9   L   38.4    MCV 91   90    MCHC 33.1   33.6       205    RDW-CV 14.0   14.0        Comprehensive Metabolic Panel  22-Sep-2023 14:17:00      Result Value    Glucose, Serum  129   H   NA  137    K  3.6    CL  105    Bicarbonate, Serum  25    Anion Gap, Serum  11    BUN  25   H   CREAT  1.07   H   GFR Female  57   A   Calcium, Serum  9.6    ALB  3.7    ALKP  61    T Pro  6.6    T Bili  0.6    Alanine Aminotransferase, Serum  21    Aspartate Transaminase, Serum  20      Coagulation Screen  22-Sep-2023 14:17:00      Result Value    Prothrombin Time, Plasma  12.1    International Normalized Ratio, Plasma  1.1    Activated Partial Thromboplastin Time  46   H     Lipid Panel  22-Sep-2023 14:17:00      Result Value    Cholesterol, Serum  112 .    AGE      DESIRABLE   BORDERLINE HIGH   HIGH   0-19 Y     0 - 169       170 - 199     >/= 200  20-24 Y     0 - 189       190 - 224     >/= 225        >24 Y     0 - 199       200 - 239     >/= 240 **All ranges are based on fasting samp    HDL Cholesterol, Serum  39.0 .    AGE      VERY LOW   LOW     NORMAL    HIGH     0-19 Y       < 35   < 40     40-45     ----  20-24 Y       ----   < 40       >45     ----    >24  Y       ----   < 40     40-60      >60.   A   Cholesterol/HDL Ratio  2.9 REF VALUESDESIRABLE  < 3.4HIGH RISK  > 5.0    LDL, Level  39 .                         NEAR      BORD    AGE      DESIRABLE  OPTIMAL    HIGH     HIGH     VERY HIGH   0-19 Y     0 - 109     ---    110-129   >/= 130      ----  20-24 Y     0 - 119     ---    120-159   >/= 160     ----    >24 Y     0 -    VLDL, Serum  34    Triglycerides, Serum  170 .    AGE      DESIRABLE   BORDERLINE HIGH   HIGH     VERY HIGH 0 D-90 D    19 - 174         ----         ----        ----91 D- 9 Y     0 -  74         75 -  99     >/= 100      ----  10-19 Y     0 -  89        90 - 129     >/= 130      ----   H     Magnesium, Serum  22-Sep-2023 14:17:00      Result Value    Magnesium, Serum  1.59   L        Radiology Results:    Results:        Conclusion:  CONCLUSIONS:  1. Left Main Coronary Artery: This artery is normal.  2. Left Anterior Descending Artery: is significantly obstructed.  3. Mid LAD Lesion: The percent stenosis is 90%.  4. 1st Diag LAD Lesion: The percent stenosis is 75%.  5. Circumflex Coronary Artery: significantly obstructed.  6. Mid CX Lesion: The percent stenosis is 99%.  7. Right Coronary Artery: significantly obstructed.  8. Mid RCA Lesion: The percent stenosis is 90%.  9. The Left Ventricular Ejection Fraction is 55%.    ____________________________________________________________________________________  CPT Codes:  Left Heart Cath (visualization of coronaries) and LV-80274; Moderate Sedation Services initial 15 minutes patient >5 years-20980; Moderate Sedation Services 1st additional 15 minutes patient >5 years-05162    ICD 10 Codes:  I20.8-Other forms of angina pectoris    [id number] Moran MD  Performing Physician  Electronically signed by Cristal Moran MD on 9/22/2023 at 11:47:00 AM      cc Report to: SARITHA MORAN    cc Report to: [id number] Moran MD          *** Final ***     Cardiac Catheterization Lab Procedures [Sep 22 2023 11:47AM]      Impression:  Old calcified granulomatous disease of the chest.     4-5 mm right middle lobe nodule with equivocal central calcification.  There is a 3 x 5 mm subpleural density in the right middle lobe  without calcification. There are 2 punctate densities in the left  lung. These findings are stable from a CT examination of 07/31/2020  favoring benign etiology.     The remaining extra vascular structures are unremarkable.       Addendum Ends  MRN: [id number]  Patient Name: GARY TRIANA     STUDY:  CTA CORONARY ART WITH HEARTFLOW IF SCORE >30%.;  9/12/2023 10:33 am     INDICATION:  Chest pain dyspnea  R06.02: SOB (shortness of breath) R07.9: Chest  pain.     COMPARISON:  None.     ACCESSION NUMBER(S):  [id number]     ORDERING CLINICIAN:  EMMA  JULIO CÉSAR     TECHNIQUE:  Using multi-detector CT technology, Ruth 64-slice scanner, axial,  sequential imaging with retrospective gating was performed of the  chest following the intravenous administration of contrast material.  A low-osmolar contrast agent was used 75 ml of Omnipaque 350.  Using prospective ECG gating, CT scan of the coronary arteries was  performed without intravenous contrast. Coronary calcium scoring was  performed according to the method of Agatston.     In addition, CT-FFR analysis was also performed.      The patient was premedicated with atenolol and 0.4 mg sublingual  nitroglycerin per protocol for heart rate control and coronary  dilation, respectively.     For optimization of anatomic evaluation, multiplanar reconstruction,  maximum intensity projections, and advanced 3-D off-line  postprocessing were performed on a dedicated stand-alone workstation  under the direct supervision of the interpreting physician.     Independent FFR analysis of Heartflow 3D model was performed by  interpreting physician.        CT Dose-Length Product (DLP):  1105.2 mGy/cm  CT Dose Reduction Employed: Yes iterative reconstruction     FINDINGS:  The left main is normal sized vessel that  bifurcates into the LAD  and circumflex.     Distal calcified plaque without significant stenosis.     LEFT ANTERIOR DESCENDING ARTERY:  The LAD is a normal size vessel that  wraps around the apex.  Proximal mixed plaque with 50% stenosis. Proximal FFR 0.96  (hemodynamically nonsignificant). Mid vessel noncalcified plaque with  significant stenosis. Mid LAD FFR 0.57 (hemodynamically significant.  LAD gives rise to 1 acute diagonal branches.  D1: Proximal calcified plaque without significant stenosis. Mid  vessel mixed plaque with 50% stenosis.     LEFT CIRCUMFLEX ARTERY:  The LCfx is a normal size vessel, which is  non-dominant.  Proximal calcified plaque without significant stenosis.  LCfx gives rise to 2 obtuse  marginal branches.  OM1 proximal noncalcified plaque without significant stenosis.  OM2 proximal mixed plaque with 50% stenosis. Mid vessel mixed plaque  with significant stenosis. Mid OM2 FFR 0.61 (hemodynamically  significant).     RIGHT CORONARY ARTERY:  The RCA is a normal size vessel, which is  dominant .     It gives rise to a conus branch, sixto branch, and 1 acute marginal  branches.  In its distal segment it bifurcates into the PDA and PV  branch.     Proximal mixed plaque without significant stenosis. Mid vessel  noncalcified plaque with 50% stenosis. Mid RCA FFR 0.86  (hemodynamically nonsignificant). Distal mixed plaque with 50-70%  stenosis. Distal FFR 0.69 (hemodynamically significant.     Right PDA proximal mixed plaque without significant stenosis.  Right PLV proximal noncalcified plaque without significant stenosis     Coronary artery calcium score 510, 95th percentile for age, gender,  and race in asymptomatic patients.     CARDIAC CHAMBERS:  The cardiac chambers demonstrate normal atrioventricular and  ventriculoarterial concordance, and systemic and pulmonary venous  return.     LEFT VENTRICLE:  Normal size End diastolic volume 157 ml, 65 ml/m2     LEFT VENTRICLE MASS:  136 gm, 57 gm/m2     RIGHT VENTRICLE:  Normal size End diastolic volume 159 ml, 66 ml/m2     LEFT ATRIUM:  Normal size End systolic volume 108 ml, 45 ml/m2     RIGHT ATRIUM:  Normal size End systolic volume 134 ml, 56 ml/m2     INTERATRIAL SEPTUM:  Intact.     AORTIC VALVE:  The aortic valve is  trileaflet in morphology. No calcifications.     MITRAL VALVE:  No thickening/calcification.     THORACICAORTA:  The visualized thoracic aorta is normal in course, caliber, and  contour.     There is no acute aortic pathology, such as dissection, intramural  hematoma, or contained rupture.     The aortic arch is not included on this examination.     PERICARDIUM:  There is no pericardial effusion of thickening.        FRACTIONAL FLOW  RESERVE     LEFT ANTERIOR DESCENDING: Proximal 0.96, mid 0.57, distal 0.52  D1: Proximal 0.92     LEFT CIRCUMFLEX: Proximal 1.0.     OM2: Proximal 0.99, mid 0.61     RIGHT CORONARY ARTERY: Proximal 0.98, mid 0.86, distal 0.69  PDA: 0.68  PLV: 0.69        IMPRESSION:  1. Significant stenosis of the mid left anterior descending coronary  artery with noncalcified plaque. FFR 0.57 (hemodynamically  significant).  2. Significant stenosis of mid OM2 with mixed plaque. FFR 0.61  (hemodynamically significant).  3. Distal dominant right coronary artery mixed plaque with 50-70%  stenosis. Distal RCA FFR 0.69 (hemodynamically significant).  4. Mild-moderate diffuse coronaryartery disease of the remaining  coronary arteries without significant stenosis.  5. Coronary artery calcium score 510, 95th percentile for age,  gender, and race in asymptomatic patients.     Reading Cardiologist: Dr. Tony Gardner, Date:  9/12/2023  12:39 pm        ADDENDUM:  Technical: The following is to serve as an over-read for a  contrast-enhanced cardiac CT, to evaluate the extra vascular  structures.     Contiguous axial CT sections are performed from the level of the  shanel to the upper abdomen following the bolus administration of 80  cc of intravenous Omnipaque 350.           Findings: There is a 9 mm calcified granuloma in the right middle  lobe. There is an adjacent scratch at nodule measuring 4-5 mm in the  right middle lobe (image 40). Faint central calcification can not be  excluded. There are least 2 punctate densities in the left lower  lobe. There is a 5 x 3 mm subpleural density anteriorly in the right  middle lobe.     There are calcified right hilar lymph nodes. There is no sign of  pathologic lymph node enlargement. There is no pericardial or pleural  effusion.     Images through the upper abdomen are unremarkable aside from  calcified granuloma in the spleen..     The visualized osseous and soft tissue structures of the chest  wall  are intact.           Impression: Old calcified granulomatous disease of the chest.     4-5 mm right middle lobe nodule with equivocal central calcification.  There is a 3 x 5 mm subpleural density in the right middle lobe  without calcification. There are 2 punctate densities in the left  lung. These findings are stable from a CT examination of 07/31/2020  favoring benign etiology.     The remaining extra vascular structures are unremarkable.        CTA Coronary Arteries with Heartflow if Score >30% [Sep 12 2023 12:58PM]      Impression:    Abnormal Lexiscan Myoview cardiac perfusion stress test.  No evidence of ischemia or myocardial infarction by perfusion imaging.  Moderate lateral wall fixed perfusion defect with normal wall motion  suggesting attenuation artifact.  Normal left ventricular systolic function, ejection fraction 54%.  Abnormal resting electrocardiogram is noted.  No comparison study available.  Clinical correlation is advised.        NM Cardiac Stress/Rest Nuclear Med Order [Aug  8 2023  1:48PM]        Consult Status:  Consult Order ID: [id number]     Problem/Assessment/Plan:    Impression 1: CAD; stable angina   Plan for Impression 1: Patient with 2-month history  of progressive stable anginal symptoms in setting of abnormal perfusion study showing lateral ischemia.  Found to have significant multivessel coronary artery disease best amenable to surgical revascularization.  Findings have been discussed and reviewed with Dr. Castaneda:  -Obtain routine preoperative testing including echocardiogram, PA lateral chest x-ray, noncontrast CT of chest, carotid duplex, bilateral lower extremity vein mapping, hemoglobin A1c, urinalysis, lipid profile, CMP, CBC, coags  -Continue optimal medical therapy with daily low-dose aspirin, statin, beta-blocker, Imdur 30 mg daily to be added to current regimen  -Stable for discharge, return electively on 9/26 for CABG       Electronic Signatures:  Solomon Wilder  "([facility]-CNP)  (Signed 22-Sep-2023 15:13)   Authored: Service, History of Present Illness, Review  Family/Social History and ROS, Allergies, Objective, Assessment/Recommendations, Note Completion      Last Updated: 22-Sep-2023 15:13 by Solomon Wilder ([facility]-CNP)    References:  1.  Data Referenced From \"History and Physical\" 22-Sep-2023 08:15   "

## 2024-07-15 ENCOUNTER — APPOINTMENT (OUTPATIENT)
Dept: RADIOLOGY | Facility: CLINIC | Age: 69
End: 2024-07-15
Payer: MEDICARE

## 2024-07-18 ENCOUNTER — HOSPITAL ENCOUNTER (OUTPATIENT)
Dept: RADIOLOGY | Facility: HOSPITAL | Age: 69
Discharge: HOME | End: 2024-07-18
Payer: MEDICARE

## 2024-07-18 DIAGNOSIS — N20.0 CALCULUS OF KIDNEY: ICD-10-CM

## 2024-07-18 PROCEDURE — 74176 CT ABD & PELVIS W/O CONTRAST: CPT | Performed by: RADIOLOGY

## 2024-07-18 PROCEDURE — 74176 CT ABD & PELVIS W/O CONTRAST: CPT

## 2024-08-06 ENCOUNTER — OFFICE VISIT (OUTPATIENT)
Dept: UROLOGY | Facility: CLINIC | Age: 69
End: 2024-08-06
Payer: MEDICARE

## 2024-08-06 VITALS
DIASTOLIC BLOOD PRESSURE: 77 MMHG | WEIGHT: 254 LBS | BODY MASS INDEX: 39.87 KG/M2 | HEIGHT: 67 IN | RESPIRATION RATE: 18 BRPM | SYSTOLIC BLOOD PRESSURE: 137 MMHG | HEART RATE: 65 BPM

## 2024-08-06 DIAGNOSIS — N39.46 URGE AND STRESS INCONTINENCE: ICD-10-CM

## 2024-08-06 DIAGNOSIS — N20.0 NEPHROLITHIASIS: Primary | ICD-10-CM

## 2024-08-06 PROCEDURE — 3078F DIAST BP <80 MM HG: CPT | Performed by: UROLOGY

## 2024-08-06 PROCEDURE — 99214 OFFICE O/P EST MOD 30 MIN: CPT | Performed by: UROLOGY

## 2024-08-06 PROCEDURE — 1159F MED LIST DOCD IN RCRD: CPT | Performed by: UROLOGY

## 2024-08-06 PROCEDURE — 1160F RVW MEDS BY RX/DR IN RCRD: CPT | Performed by: UROLOGY

## 2024-08-06 PROCEDURE — 1036F TOBACCO NON-USER: CPT | Performed by: UROLOGY

## 2024-08-06 PROCEDURE — 3075F SYST BP GE 130 - 139MM HG: CPT | Performed by: UROLOGY

## 2024-08-06 PROCEDURE — 4010F ACE/ARB THERAPY RXD/TAKEN: CPT | Performed by: UROLOGY

## 2024-08-06 PROCEDURE — G2211 COMPLEX E/M VISIT ADD ON: HCPCS | Performed by: UROLOGY

## 2024-08-06 PROCEDURE — 3008F BODY MASS INDEX DOCD: CPT | Performed by: UROLOGY

## 2024-08-06 PROCEDURE — 1126F AMNT PAIN NOTED NONE PRSNT: CPT | Performed by: UROLOGY

## 2024-08-06 RX ORDER — ALLOPURINOL 300 MG/1
300 TABLET ORAL DAILY
Qty: 90 TABLET | Refills: 3 | Status: SHIPPED | OUTPATIENT
Start: 2024-08-06

## 2024-08-06 ASSESSMENT — ENCOUNTER SYMPTOMS
DIFFICULTY URINATING: 0
FREQUENCY: 0
DYSURIA: 0
HEMATURIA: 0

## 2024-08-06 ASSESSMENT — PAIN SCALES - GENERAL: PAINLEVEL: 0-NO PAIN

## 2024-08-06 NOTE — PATIENT INSTRUCTIONS
Patient Discussion/Summary     It was very nice to see you again. 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  You will continue on daily allopurinol.  I will see you again in August.      This note was created with voice-recognition software and was not corrected for typographical or grammatical errors

## 2024-08-06 NOTE — PROGRESS NOTES
Provider Impressions     68 year-old female referred for a BENIGN RENAL TUMOR and NEPHROLITHIASIS. She is also complaining of URGE URINARY INCONTINENCE and a history of urinary TRACT INFECTIONS. She recently, 2009, at the [facility], underwent LITHOTRIPSY for multiple left-sided RENAL CALCULI. 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 Patient is employed as an  at the [employer]. She has no family history of breast or prostate cancer. She has never smoked cigarettes.     09/24/14 patient returns for a CYSTOSCOPY, flowrate and postvoid residual. There was also evidence of stone disease, and she has obtained an intravenous pyelogram. The CYSTOSCOPY did not reveal any evidence of cystocele, erythema or tumors. Patient was unable to perform a flow rate and postvoid residual. IVP shows 5 STONES in the range of 2-5 mm in the left kidney. We initiated Toviaz 8 mg by mouth to assist with her URGE INCONTINENCE. The patient did not wish to proceed with any treatment of her STONES. at this time.     12/29/14 patient has been in the emergency room on 2 separate occasions over the last month for left-sided FLANK PAIN. She presently does not have pain. A renal colic CAT scan did identify a 1.0 cm left lower pole STONE and multiple 0.4 cm STONES. We have obtained an intravenous pyelogram which indeed shows 3 separate 0.4 cm left RENAL CALCULI but the 1.0 cm stone was not immediately evident in the lower pole. In addition, due to insurance issues, the patient never obtained the Toviaz and is not interested in alternatives at this time. She does wish to pursue lithotripsy, however due to her very busy work schedule, she cannot schedule the procedure until March.     05/21/15, OR, CYSTOSCOPY, LEFT RETROPYELOGRAM, PLACEMENT OF LEFT URETERAL STENT, ESWL OF LEFT RENAL CALCULUS     06/18/15, OR, CYSTOSCOPY, REMOVAL OF LEFT URETERAL STENT, LEFT RETROPYELOGRAM, LEFT  URETEROSCOPY AND REMOVAL OF ONE URETERAL STONE, LEFT URETEROSCOPY AND REMOVAL OF 3 RENAL CALCULI.     URIC ACID stones     COMPUTERS DOWN COMPUTERS DOWN COMPUTERS DOWN     11/16/15, renal ultrasound shows 3 stones in the right kidney measuring 0.6, 0.8, and 0.8 cm each. It also identifies a right 2 cm renal mass. We will obtain a dedicated renal CAT scan for the mass and also a KUB 20 identify the stones. If we can see the stones on the KUB we will proceed with ESWL. If not, then we will require ureteroscopy and laser lithotripsy in the kidney.     12/28/15, CAT scan of the abdomen and pelvis shows no evidence of right sided renal calculi. The 6 cm angiomyolipoma on the right kidney is unchanged. There are 5 tiny left renal calculi all less than 3 mm. KUB does not see any stones.     8/15/2016â€“Established patient here today for follow-up and review of testing. including renal colic CT and urine cytology. Patient states on occasion she will have right flank pain. no hematuria, no dysuria, no recent frequency or urgency. She was treated for UTI by PCP approx. 1 month ago.Patient did not complete 24-hour urine, states she was on vacation. She also indicates that she returned the kit to the lab at [facility].     Renal Colic CT- new less than 3 mm nonobstructing left renal stone. Cluster of 3 tiny less than 3 mm stones in the left renal calyx no other new radiodense stone anywhere else the urinary tract. Phytic lower pole right renal angiomyolipoma is unchanged. Urine cytology was negative.     08/08/17, BIRTHDAY GIRL, urine cytology is again atypical. Renal colic CAT scan once again reveals an unchanged 5.7 cm right lower pole angiomyolipoma. No stones seen. She will return in 1 year. She will continue on allopurinol.     11/05/18, patient complains of dysuria and frequent urination. Urinalysis in office reveals nitrates and leukocytes. We will send her urine for culture and sensitivity. I will prophylactically  treat with Macrobid for 3 days. Renal colic CAT scan once again identifies a 4.8 cm right renal angiomyolipoma unchanged. Urine cytology is negative.     06/25/19, OR, bilateral knee replacement     09/20/19, patient once again has a UTI of Klebsiella. We will treat with Bactrim. She does have urgency, frequency and incomplete emptying. I will schedule a cystoscopy with urodynamic studies. She most probably will benefit from an alpha agent and a twice a week sulfa drugs. Urinalysis and urine cytology are negative. Renal colic CAT scan identifies a 5 mm upper pole stone and a 4.7 cm right lower pole stable angiomyolipoma. We will obtain an IVP and discuss ESWL when she returns for her cystoscopy.     07/13/20, pulmonary embolism, hospital admission, began ELIQUIS, sees Dr. Gina MACIAS     10/05/20, cystoscopy with urodynamics performed. Poor flow rate of 1 cc/s with a large volume of 490 cc. Cystoscopy reveals caked on debris within the trigone and floating debris throughout. IVP shows either a 9 mm stone in the left kidney or a composition of multiple 2 mm stones. She has intermittent pain. However in light of her recently begun ELIQUIS for pulmonary embolism, we will continue to observe before proceeding with any surgical intervention. I will begin her on Flomax daily and Bactrim twice a week for her recurrent UTIs and poor emptying as seen on the urodynamics. She will return in 3 months.     May 3, 2021, cystoscopy today shows significant improvement. Much less debris and virtually no erythema. Interestingly, the patient has discontinued Flomax and Bactrim. She retired last year and has begun a much healthier diet with virtually only water as her liquid intake. We will continue to follow and I will not recommend another cystoscopy unless her condition changes. We will see her again in August with her yearly laboratory testing. Surgery for stone disease is limited due to her history of pulmonary emboli.      August 14, 2021, patient arrives alone. She has no new complaints. Urine culture did show enteric bacilli and we will treat with Bactrim, cystoscopy, cystoscopy, left proximal stone which is unchanged. Angiomyolipoma in the right kidney is also unchanged. Urine cytology is negative, urinalysis 3 red blood cells she will continue on allopurinol 300 mg daily. He has discontinued Eliquis at this time and is being treated by Dr. Ross for deep vein thrombosis.     August 15, 2021, telephone call, urine cultures positive for E. coli, treated with Bactrim     August 1, 2023, patient arrives alone. She has no new urologic complaints. Recently, on July 19 she had angiogram for possible pulmonary embolus which was negative. She is now undergoing a cardiac workup for chest pain. CAT scan without contrast of the abdomen and pelvis shows no significant change in the right angiomyolipoma. Unchanged calcifications in the left kidney. Creatinine is 1.1. Urine cytology is negative for malignant cells. Urine culture no growth and urinalysis no blood. We will continue to follow and play close attention to her other comorbidities. We will not proceed with surgical intervention of her stones unless she is symptomatic. She also is concerned regarding allopurinol as it may affect her cardiac situation as she has read in the package insert. She will discuss this with her cardiologist.    October 2023, CABG x 4 at the [facility]    August 6, 2024, patient arrives alone.  She has no new urologic complaints.  Urine studies were not completed.  Renal colic CAT scan shows a stable 10 mm nonobstructing calculus in the midpole of the left kidney.  Also an angiomyolipoma of 4.9 cm in the right kidney which is stable.  She will return in 1 year.  She is a poor surgical candidate due to her cardiac episodes and multiple pulmonary emboli when she is off of anticoagulants.  Continue allopurinol 300 mg p.o. daily.     PLAN:     #1 allopurinol  300 mg by mouth daily     #2 full evaluations with renal colic CAT scan, urine studies every August     3. LIMITED SURGERY for stone disease unless symptomatic due to a history of pulmonary emboli.      This note was created with voice-recognition software and was not corrected for typographical or grammatical errors.

## 2024-08-06 NOTE — PROGRESS NOTES
Patient denies any pain today. Patient had quadruple bypass with [facility] 10/2023. Patient denies any concerns about falling or safety. Patient has no urinary issues. Patient only completed ct scan, unable to complete urines. CV    Review of Systems   Genitourinary:  Negative for difficulty urinating, dysuria, enuresis, frequency, hematuria and urgency.   All other systems reviewed and are negative.

## 2024-08-06 NOTE — LETTER
August 6, 2024     Tiffanie Snow MD  [address] [place] [address]    Patient: Beckie Tran   YOB: 1955   Date of Visit: 8/6/2024       Dear Dr. Tiffanie Snow MD:    Thank you for referring Beckie Tran to me for evaluation. Below are my notes for this consultation.  If you have questions, please do not hesitate to call me. I look forward to following your patient along with you.       Sincerely,     Omi Chopra MD      CC: No Recipients  ______________________________________________________________________________________      Provider Impressions     68 year-old female referred for a BENIGN RENAL TUMOR and NEPHROLITHIASIS. She is also complaining of URGE URINARY INCONTINENCE and a history of urinary TRACT INFECTIONS. She recently, 2009, at the [facility], underwent LITHOTRIPSY for multiple left-sided RENAL CALCULI. 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 Patient is employed as an  at the [employer]. She has no family history of breast or prostate cancer. She has never smoked cigarettes.     09/24/14 patient returns for a CYSTOSCOPY, flowrate and postvoid residual. There was also evidence of stone disease, and she has obtained an intravenous pyelogram. The CYSTOSCOPY did not reveal any evidence of cystocele, erythema or tumors. Patient was unable to perform a flow rate and postvoid residual. IVP shows 5 STONES in the range of 2-5 mm in the left kidney. We initiated Toviaz 8 mg by mouth to assist with her URGE INCONTINENCE. The patient did not wish to proceed with any treatment of her STONES. at this time.     12/29/14 patient has been in the emergency room on 2 separate occasions over the last month for left-sided FLANK PAIN. She presently does not have pain. A renal colic CAT scan did identify a 1.0 cm left lower pole STONE and multiple 0.4 cm STONES. We have obtained an intravenous pyelogram which indeed shows 3  separate 0.4 cm left RENAL CALCULI but the 1.0 cm stone was not immediately evident in the lower pole. In addition, due to insurance issues, the patient never obtained the Toviaz and is not interested in alternatives at this time. She does wish to pursue lithotripsy, however due to her very busy work schedule, she cannot schedule the procedure until March.     05/21/15, OR, CYSTOSCOPY, LEFT RETROPYELOGRAM, PLACEMENT OF LEFT URETERAL STENT, ESWL OF LEFT RENAL CALCULUS     06/18/15, OR, CYSTOSCOPY, REMOVAL OF LEFT URETERAL STENT, LEFT RETROPYELOGRAM, LEFT URETEROSCOPY AND REMOVAL OF ONE URETERAL STONE, LEFT URETEROSCOPY AND REMOVAL OF 3 RENAL CALCULI.     URIC ACID stones     COMPUTERS DOWN COMPUTERS DOWN COMPUTERS DOWN     11/16/15, renal ultrasound shows 3 stones in the right kidney measuring 0.6, 0.8, and 0.8 cm each. It also identifies a right 2 cm renal mass. We will obtain a dedicated renal CAT scan for the mass and also a KUB 20 identify the stones. If we can see the stones on the KUB we will proceed with ESWL. If not, then we will require ureteroscopy and laser lithotripsy in the kidney.     12/28/15, CAT scan of the abdomen and pelvis shows no evidence of right sided renal calculi. The 6 cm angiomyolipoma on the right kidney is unchanged. There are 5 tiny left renal calculi all less than 3 mm. KUB does not see any stones.     8/15/2016â€“Established patient here today for follow-up and review of testing. including renal colic CT and urine cytology. Patient states on occasion she will have right flank pain. no hematuria, no dysuria, no recent frequency or urgency. She was treated for UTI by PCP approx. 1 month ago.Patient did not complete 24-hour urine, states she was on vacation. She also indicates that she returned the kit to the lab at [facility].     Renal Colic CT- new less than 3 mm nonobstructing left renal stone. Cluster of 3 tiny less than 3 mm stones in the left renal calyx no other new  radiodense stone anywhere else the urinary tract. Phytic lower pole right renal angiomyolipoma is unchanged. Urine cytology was negative.     08/08/17, BIRTHDAY GIRL, urine cytology is again atypical. Renal colic CAT scan once again reveals an unchanged 5.7 cm right lower pole angiomyolipoma. No stones seen. She will return in 1 year. She will continue on allopurinol.     11/05/18, patient complains of dysuria and frequent urination. Urinalysis in office reveals nitrates and leukocytes. We will send her urine for culture and sensitivity. I will prophylactically treat with Macrobid for 3 days. Renal colic CAT scan once again identifies a 4.8 cm right renal angiomyolipoma unchanged. Urine cytology is negative.     06/25/19, OR, bilateral knee replacement     09/20/19, patient once again has a UTI of Klebsiella. We will treat with Bactrim. She does have urgency, frequency and incomplete emptying. I will schedule a cystoscopy with urodynamic studies. She most probably will benefit from an alpha agent and a twice a week sulfa drugs. Urinalysis and urine cytology are negative. Renal colic CAT scan identifies a 5 mm upper pole stone and a 4.7 cm right lower pole stable angiomyolipoma. We will obtain an IVP and discuss ESWL when she returns for her cystoscopy.     07/13/20, pulmonary embolism, hospital admission, began ELIQUIS, sees Dr. Gina MACIAS     10/05/20, cystoscopy with urodynamics performed. Poor flow rate of 1 cc/s with a large volume of 490 cc. Cystoscopy reveals caked on debris within the trigone and floating debris throughout. IVP shows either a 9 mm stone in the left kidney or a composition of multiple 2 mm stones. She has intermittent pain. However in light of her recently begun ELIQUIS for pulmonary embolism, we will continue to observe before proceeding with any surgical intervention. I will begin her on Flomax daily and Bactrim twice a week for her recurrent UTIs and poor emptying as seen on the  urodynamics. She will return in 3 months.     May 3, 2021, cystoscopy today shows significant improvement. Much less debris and virtually no erythema. Interestingly, the patient has discontinued Flomax and Bactrim. She retired last year and has begun a much healthier diet with virtually only water as her liquid intake. We will continue to follow and I will not recommend another cystoscopy unless her condition changes. We will see her again in August with her yearly laboratory testing. Surgery for stone disease is limited due to her history of pulmonary emboli.     August 14, 2021, patient arrives alone. She has no new complaints. Urine culture did show enteric bacilli and we will treat with Bactrim, cystoscopy, cystoscopy, left proximal stone which is unchanged. Angiomyolipoma in the right kidney is also unchanged. Urine cytology is negative, urinalysis 3 red blood cells she will continue on allopurinol 300 mg daily. He has discontinued Eliquis at this time and is being treated by Dr. Ross for deep vein thrombosis.     August 15, 2021, telephone call, urine cultures positive for E. coli, treated with Bactrim     August 1, 2023, patient arrives alone. She has no new urologic complaints. Recently, on July 19 she had angiogram for possible pulmonary embolus which was negative. She is now undergoing a cardiac workup for chest pain. CAT scan without contrast of the abdomen and pelvis shows no significant change in the right angiomyolipoma. Unchanged calcifications in the left kidney. Creatinine is 1.1. Urine cytology is negative for malignant cells. Urine culture no growth and urinalysis no blood. We will continue to follow and play close attention to her other comorbidities. We will not proceed with surgical intervention of her stones unless she is symptomatic. She also is concerned regarding allopurinol as it may affect her cardiac situation as she has read in the package insert. She will discuss this with her  cardiologist.    October 2023, CABG x 4 at the [facility]    August 6, 2024, patient arrives alone.  She has no new urologic complaints.  Urine studies were not completed.  Renal colic CAT scan shows a stable 10 mm nonobstructing calculus in the midpole of the left kidney.  Also an angiomyolipoma of 4.9 cm in the right kidney which is stable.  She will return in 1 year.  She is a poor surgical candidate due to her cardiac episodes and multiple pulmonary emboli when she is off of anticoagulants.  Continue allopurinol 300 mg p.o. daily.     PLAN:     #1 allopurinol 300 mg by mouth daily     #2 full evaluations with renal colic CAT scan, urine studies every August     3. LIMITED SURGERY for stone disease unless symptomatic due to a history of pulmonary emboli.      This note was created with voice-recognition software and was not corrected for typographical or grammatical errors.

## 2024-12-11 ENCOUNTER — TRANSCRIBE ORDERS (OUTPATIENT)
Dept: ADMINISTRATIVE | Age: 69
End: 2024-12-11

## 2024-12-11 DIAGNOSIS — I82.432 ACUTE EMBOLISM AND THROMBOSIS OF LEFT POPLITEAL VEIN (HCC): ICD-10-CM

## 2024-12-11 DIAGNOSIS — I82.411 ACUTE EMBOLISM AND THROMBOSIS OF RIGHT FEMORAL VEIN (HCC): ICD-10-CM

## 2024-12-11 DIAGNOSIS — I26.99 OTHER PULMONARY EMBOLISM WITHOUT ACUTE COR PULMONALE, UNSPECIFIED CHRONICITY (HCC): Primary | ICD-10-CM

## 2024-12-17 ENCOUNTER — HOSPITAL ENCOUNTER (OUTPATIENT)
Dept: ULTRASOUND IMAGING | Age: 69
Discharge: HOME | End: 2024-12-19
Attending: INTERNAL MEDICINE
Payer: COMMERCIAL

## 2024-12-17 DIAGNOSIS — I82.432 ACUTE EMBOLISM AND THROMBOSIS OF LEFT POPLITEAL VEIN (HCC): ICD-10-CM

## 2024-12-17 DIAGNOSIS — I82.411 ACUTE EMBOLISM AND THROMBOSIS OF RIGHT FEMORAL VEIN (HCC): ICD-10-CM

## 2024-12-17 DIAGNOSIS — I26.99 OTHER PULMONARY EMBOLISM WITHOUT ACUTE COR PULMONALE, UNSPECIFIED CHRONICITY (HCC): ICD-10-CM

## 2024-12-17 PROCEDURE — 93971 EXTREMITY STUDY: CPT

## 2025-06-02 ENCOUNTER — HOSPITAL ENCOUNTER (OUTPATIENT)
Dept: WOMENS IMAGING | Age: 70
Discharge: HOME | End: 2025-06-04
Payer: MEDICARE

## 2025-06-02 VITALS — BODY MASS INDEX: 42.41 KG/M2 | HEIGHT: 68 IN

## 2025-06-02 DIAGNOSIS — Z12.31 ENCOUNTER FOR SCREENING MAMMOGRAM FOR MALIGNANT NEOPLASM OF BREAST: ICD-10-CM

## 2025-06-02 PROCEDURE — 77063 BREAST TOMOSYNTHESIS BI: CPT

## 2025-07-21 ENCOUNTER — HOSPITAL ENCOUNTER (OUTPATIENT)
Dept: RADIOLOGY | Facility: HOSPITAL | Age: 70
Discharge: HOME | End: 2025-07-21
Payer: MEDICARE

## 2025-07-21 DIAGNOSIS — N20.0 NEPHROLITHIASIS: ICD-10-CM

## 2025-07-21 PROCEDURE — 74176 CT ABD & PELVIS W/O CONTRAST: CPT

## 2025-07-21 PROCEDURE — 74176 CT ABD & PELVIS W/O CONTRAST: CPT | Performed by: RADIOLOGY

## 2025-08-04 ENCOUNTER — APPOINTMENT (OUTPATIENT)
Dept: UROLOGY | Facility: CLINIC | Age: 70
End: 2025-08-04
Payer: MEDICARE

## 2025-08-04 VITALS
HEIGHT: 67 IN | WEIGHT: 237 LBS | DIASTOLIC BLOOD PRESSURE: 73 MMHG | RESPIRATION RATE: 16 BRPM | SYSTOLIC BLOOD PRESSURE: 134 MMHG | BODY MASS INDEX: 37.2 KG/M2 | HEART RATE: 60 BPM

## 2025-08-04 DIAGNOSIS — N39.46 URGE AND STRESS INCONTINENCE: ICD-10-CM

## 2025-08-04 DIAGNOSIS — N20.0 NEPHROLITHIASIS: Primary | ICD-10-CM

## 2025-08-04 DIAGNOSIS — R82.89 ABNORMAL URINE CYTOLOGY: ICD-10-CM

## 2025-08-04 PROCEDURE — 1160F RVW MEDS BY RX/DR IN RCRD: CPT | Performed by: UROLOGY

## 2025-08-04 PROCEDURE — 3075F SYST BP GE 130 - 139MM HG: CPT | Performed by: UROLOGY

## 2025-08-04 PROCEDURE — 99214 OFFICE O/P EST MOD 30 MIN: CPT | Performed by: UROLOGY

## 2025-08-04 PROCEDURE — 1159F MED LIST DOCD IN RCRD: CPT | Performed by: UROLOGY

## 2025-08-04 PROCEDURE — 1126F AMNT PAIN NOTED NONE PRSNT: CPT | Performed by: UROLOGY

## 2025-08-04 PROCEDURE — G2211 COMPLEX E/M VISIT ADD ON: HCPCS | Performed by: UROLOGY

## 2025-08-04 PROCEDURE — 1036F TOBACCO NON-USER: CPT | Performed by: UROLOGY

## 2025-08-04 PROCEDURE — 3078F DIAST BP <80 MM HG: CPT | Performed by: UROLOGY

## 2025-08-04 PROCEDURE — 88112 CYTOPATH CELL ENHANCE TECH: CPT

## 2025-08-04 PROCEDURE — 4010F ACE/ARB THERAPY RXD/TAKEN: CPT | Performed by: UROLOGY

## 2025-08-04 PROCEDURE — 3008F BODY MASS INDEX DOCD: CPT | Performed by: UROLOGY

## 2025-08-04 RX ORDER — ALLOPURINOL 300 MG/1
300 TABLET ORAL DAILY
Qty: 90 TABLET | Refills: 3 | Status: SHIPPED | OUTPATIENT
Start: 2025-08-04

## 2025-08-04 ASSESSMENT — ENCOUNTER SYMPTOMS
LOSS OF SENSATION IN FEET: 1
DYSURIA: 0
OCCASIONAL FEELINGS OF UNSTEADINESS: 0
DEPRESSION: 0
FREQUENCY: 0
HEMATURIA: 0
DIFFICULTY URINATING: 0

## 2025-08-04 ASSESSMENT — PAIN SCALES - GENERAL: PAINLEVEL_OUTOF10: 0-NO PAIN

## 2025-08-04 NOTE — PROGRESS NOTES
Patient denies any pain today. Patient has not had any recent surgeries or hospital admits. Patient denies any concerns about falling or safety. Patient notices she will leak if she holds her bladder for too long (hours at a time). Patient was unable to complete lab testing for today's visit. CV    Review of Systems   Genitourinary:  Positive for urgency. Negative for decreased urine volume, difficulty urinating, dysuria, enuresis, frequency and hematuria.

## 2025-08-04 NOTE — LETTER
August 4, 2025     Tiffanie Snow MD  [address] [place] [address]    Patient: Beckie Tran   YOB: 1955   Date of Visit: 8/4/2025       Dear Dr. Tiffanie Snow MD:    Thank you for referring Beckie Tran to me for evaluation. Below are my notes for this consultation.  If you have questions, please do not hesitate to call me. I look forward to following your patient along with you.       Sincerely,     Omi Chopra MD      CC: No Recipients  ______________________________________________________________________________________    Provider Impressions     69 year-old female referred for a BENIGN RENAL TUMOR and NEPHROLITHIASIS. She is also complaining of URGE URINARY INCONTINENCE and a history of urinary TRACT INFECTIONS. She recently, 2009, at the [facility], underwent LITHOTRIPSY for multiple left-sided RENAL CALCULI. 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 Patient is employed as an  at the [employer]. She has no family history of breast or prostate cancer. She has never smoked cigarettes.     09/24/14 patient returns for a CYSTOSCOPY, flowrate and postvoid residual. There was also evidence of stone disease, and she has obtained an intravenous pyelogram. The CYSTOSCOPY did not reveal any evidence of cystocele, erythema or tumors. Patient was unable to perform a flow rate and postvoid residual. IVP shows 5 STONES in the range of 2-5 mm in the left kidney. We initiated Toviaz 8 mg by mouth to assist with her URGE INCONTINENCE. The patient did not wish to proceed with any treatment of her STONES. at this time.     12/29/14 patient has been in the emergency room on 2 separate occasions over the last month for left-sided FLANK PAIN. She presently does not have pain. A renal colic CAT scan did identify a 1.0 cm left lower pole STONE and multiple 0.4 cm STONES. We have obtained an intravenous pyelogram which indeed shows 3  separate 0.4 cm left RENAL CALCULI but the 1.0 cm stone was not immediately evident in the lower pole. In addition, due to insurance issues, the patient never obtained the Toviaz and is not interested in alternatives at this time. She does wish to pursue lithotripsy, however due to her very busy work schedule, she cannot schedule the procedure until March.     05/21/15, OR, CYSTOSCOPY, LEFT RETROPYELOGRAM, PLACEMENT OF LEFT URETERAL STENT, ESWL OF LEFT RENAL CALCULUS     06/18/15, OR, CYSTOSCOPY, REMOVAL OF LEFT URETERAL STENT, LEFT RETROPYELOGRAM, LEFT URETEROSCOPY AND REMOVAL OF ONE URETERAL STONE, LEFT URETEROSCOPY AND REMOVAL OF 3 RENAL CALCULI.     URIC ACID stones     COMPUTERS DOWN COMPUTERS DOWN COMPUTERS DOWN     11/16/15, renal ultrasound shows 3 stones in the right kidney measuring 0.6, 0.8, and 0.8 cm each. It also identifies a right 2 cm renal mass. We will obtain a dedicated renal CAT scan for the mass and also a KUB 20 identify the stones. If we can see the stones on the KUB we will proceed with ESWL. If not, then we will require ureteroscopy and laser lithotripsy in the kidney.     12/28/15, CAT scan of the abdomen and pelvis shows no evidence of right sided renal calculi. The 6 cm angiomyolipoma on the right kidney is unchanged. There are 5 tiny left renal calculi all less than 3 mm. KUB does not see any stones.     8/15/2016â€“Established patient here today for follow-up and review of testing. including renal colic CT and urine cytology. Patient states on occasion she will have right flank pain. no hematuria, no dysuria, no recent frequency or urgency. She was treated for UTI by PCP approx. 1 month ago.Patient did not complete 24-hour urine, states she was on vacation. She also indicates that she returned the kit to the lab at [facility].     Renal Colic CT- new less than 3 mm nonobstructing left renal stone. Cluster of 3 tiny less than 3 mm stones in the left renal calyx no other new  radiodense stone anywhere else the urinary tract. Phytic lower pole right renal angiomyolipoma is unchanged. Urine cytology was negative.     08/08/17, BIRTHDAY GIRL, urine cytology is again atypical. Renal colic CAT scan once again reveals an unchanged 5.7 cm right lower pole angiomyolipoma. No stones seen. She will return in 1 year. She will continue on allopurinol.     11/05/18, patient complains of dysuria and frequent urination. Urinalysis in office reveals nitrates and leukocytes. We will send her urine for culture and sensitivity. I will prophylactically treat with Macrobid for 3 days. Renal colic CAT scan once again identifies a 4.8 cm right renal angiomyolipoma unchanged. Urine cytology is negative.     06/25/19, OR, bilateral knee replacement     09/20/19, patient once again has a UTI of Klebsiella. We will treat with Bactrim. She does have urgency, frequency and incomplete emptying. I will schedule a cystoscopy with urodynamic studies. She most probably will benefit from an alpha agent and a twice a week sulfa drugs. Urinalysis and urine cytology are negative. Renal colic CAT scan identifies a 5 mm upper pole stone and a 4.7 cm right lower pole stable angiomyolipoma. We will obtain an IVP and discuss ESWL when she returns for her cystoscopy.     07/13/20, pulmonary embolism, hospital admission, began ELIQUIS, sees Dr. Gina MACIAS     10/05/20, cystoscopy with urodynamics performed. Poor flow rate of 1 cc/s with a large volume of 490 cc. Cystoscopy reveals caked on debris within the trigone and floating debris throughout. IVP shows either a 9 mm stone in the left kidney or a composition of multiple 2 mm stones. She has intermittent pain. However in light of her recently begun ELIQUIS for pulmonary embolism, we will continue to observe before proceeding with any surgical intervention. I will begin her on Flomax daily and Bactrim twice a week for her recurrent UTIs and poor emptying as seen on the  urodynamics. She will return in 3 months.     May 3, 2021, cystoscopy today shows significant improvement. Much less debris and virtually no erythema. Interestingly, the patient has discontinued Flomax and Bactrim. She retired last year and has begun a much healthier diet with virtually only water as her liquid intake. We will continue to follow and I will not recommend another cystoscopy unless her condition changes. We will see her again in August with her yearly laboratory testing. Surgery for stone disease is limited due to her history of pulmonary emboli.     August 14, 2021, patient arrives alone. She has no new complaints. Urine culture did show enteric bacilli and we will treat with Bactrim, cystoscopy, cystoscopy, left proximal stone which is unchanged. Angiomyolipoma in the right kidney is also unchanged. Urine cytology is negative, urinalysis 3 red blood cells she will continue on allopurinol 300 mg daily. He has discontinued Eliquis at this time and is being treated by Dr. Ross for deep vein thrombosis.     August 15, 2021, telephone call, urine cultures positive for E. coli, treated with Bactrim     August 1, 2023, patient arrives alone. She has no new urologic complaints. Recently, on July 19 she had angiogram for possible pulmonary embolus which was negative. She is now undergoing a cardiac workup for chest pain. CAT scan without contrast of the abdomen and pelvis shows no significant change in the right angiomyolipoma. Unchanged calcifications in the left kidney. Creatinine is 1.1. Urine cytology is negative for malignant cells. Urine culture no growth and urinalysis no blood. We will continue to follow and play close attention to her other comorbidities. We will not proceed with surgical intervention of her stones unless she is symptomatic. She also is concerned regarding allopurinol as it may affect her cardiac situation as she has read in the package insert. She will discuss this with her  "cardiologist.     October 2023, CABG x 4 at the [facility]     August 6, 2024, patient arrives alone.  She has no new urologic complaints.  Urine studies were not completed.  Renal colic CAT scan shows a stable 10 mm nonobstructing calculus in the midpole of the left kidney.  Also an angiomyolipoma of 4.9 cm in the right kidney which is stable.  She will return in 1 year.  She is a poor surgical candidate due to her cardiac episodes and multiple pulmonary emboli when she is off of anticoagulants.  Continue allopurinol 300 mg p.o. daily.    August 4, 2025, patient arrives alone.  Once again she has no new urologic complaints.  For the second year in a row she did not obtain her urine studies as ordered.  Renal colic CAT scan reveals a 7 mm nonobstructing stone in the midpole of the left kidney which is unchanged.  It also identified mild wall thickening and minimal pericolonic fat stranding involving the proximal ascending colon.  Recommendation is for evaluation.  The patient states that she had a colonoscopy in 2023 at the [facility] which was negative.  I suggested a GI consult OR one of our colorectal surgeons in consultation.  She states \"I am good\".  She does not want any further evaluation.  She also is considering discontinuing her allopurinol since she has not made any new stones.  I did stress the importance of maintaining the medication and a follow-up for the colonic finding but deferred to her strong conviction.  Will see her again in a year     PLAN:     #1 allopurinol 300 mg by mouth daily     #2 full evaluations with renal colic CAT scan, urine studies every August     3. LIMITED SURGERY for stone disease unless symptomatic due to a history of pulmonary emboli.      This note was created with voice-recognition software and was not corrected for typographical or grammatical errors.       "

## 2025-08-04 NOTE — PATIENT INSTRUCTIONS
Patient Discussion/Summary     It was very nice to see you again. Your CAT scan shows a stable 7 mm stone in the left kidney .  There is a question of thickened colon on the right side.  You state that you had a colonoscopy less than 2 years ago which was negative and you do not wish to further pursue or evaluate this issue.  I did stress the importance but you are quite convinced not to pursue it and wish to just obtain another CAT scan next year.  You will continue on daily allopurinol.  I will see you again in August.      This note was created with voice-recognition software and was not corrected for typographical or grammatical errors

## 2025-08-04 NOTE — PROGRESS NOTES
Provider Impressions     69 year-old female referred for a BENIGN RENAL TUMOR and NEPHROLITHIASIS. She is also complaining of URGE URINARY INCONTINENCE and a history of urinary TRACT INFECTIONS. She recently, 2009, at the [facility], underwent LITHOTRIPSY for multiple left-sided RENAL CALCULI. 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 Patient is employed as an  at the [employer]. She has no family history of breast or prostate cancer. She has never smoked cigarettes.     09/24/14 patient returns for a CYSTOSCOPY, flowrate and postvoid residual. There was also evidence of stone disease, and she has obtained an intravenous pyelogram. The CYSTOSCOPY did not reveal any evidence of cystocele, erythema or tumors. Patient was unable to perform a flow rate and postvoid residual. IVP shows 5 STONES in the range of 2-5 mm in the left kidney. We initiated Toviaz 8 mg by mouth to assist with her URGE INCONTINENCE. The patient did not wish to proceed with any treatment of her STONES. at this time.     12/29/14 patient has been in the emergency room on 2 separate occasions over the last month for left-sided FLANK PAIN. She presently does not have pain. A renal colic CAT scan did identify a 1.0 cm left lower pole STONE and multiple 0.4 cm STONES. We have obtained an intravenous pyelogram which indeed shows 3 separate 0.4 cm left RENAL CALCULI but the 1.0 cm stone was not immediately evident in the lower pole. In addition, due to insurance issues, the patient never obtained the Toviaz and is not interested in alternatives at this time. She does wish to pursue lithotripsy, however due to her very busy work schedule, she cannot schedule the procedure until March.     05/21/15, OR, CYSTOSCOPY, LEFT RETROPYELOGRAM, PLACEMENT OF LEFT URETERAL STENT, ESWL OF LEFT RENAL CALCULUS     06/18/15, OR, CYSTOSCOPY, REMOVAL OF LEFT URETERAL STENT, LEFT RETROPYELOGRAM, LEFT  URETEROSCOPY AND REMOVAL OF ONE URETERAL STONE, LEFT URETEROSCOPY AND REMOVAL OF 3 RENAL CALCULI.     URIC ACID stones     COMPUTERS DOWN COMPUTERS DOWN COMPUTERS DOWN     11/16/15, renal ultrasound shows 3 stones in the right kidney measuring 0.6, 0.8, and 0.8 cm each. It also identifies a right 2 cm renal mass. We will obtain a dedicated renal CAT scan for the mass and also a KUB 20 identify the stones. If we can see the stones on the KUB we will proceed with ESWL. If not, then we will require ureteroscopy and laser lithotripsy in the kidney.     12/28/15, CAT scan of the abdomen and pelvis shows no evidence of right sided renal calculi. The 6 cm angiomyolipoma on the right kidney is unchanged. There are 5 tiny left renal calculi all less than 3 mm. KUB does not see any stones.     8/15/2016â€“Established patient here today for follow-up and review of testing. including renal colic CT and urine cytology. Patient states on occasion she will have right flank pain. no hematuria, no dysuria, no recent frequency or urgency. She was treated for UTI by PCP approx. 1 month ago.Patient did not complete 24-hour urine, states she was on vacation. She also indicates that she returned the kit to the lab at [facility].     Renal Colic CT- new less than 3 mm nonobstructing left renal stone. Cluster of 3 tiny less than 3 mm stones in the left renal calyx no other new radiodense stone anywhere else the urinary tract. Phytic lower pole right renal angiomyolipoma is unchanged. Urine cytology was negative.     08/08/17, BIRTHDAY GIRL, urine cytology is again atypical. Renal colic CAT scan once again reveals an unchanged 5.7 cm right lower pole angiomyolipoma. No stones seen. She will return in 1 year. She will continue on allopurinol.     11/05/18, patient complains of dysuria and frequent urination. Urinalysis in office reveals nitrates and leukocytes. We will send her urine for culture and sensitivity. I will prophylactically  treat with Macrobid for 3 days. Renal colic CAT scan once again identifies a 4.8 cm right renal angiomyolipoma unchanged. Urine cytology is negative.     06/25/19, OR, bilateral knee replacement     09/20/19, patient once again has a UTI of Klebsiella. We will treat with Bactrim. She does have urgency, frequency and incomplete emptying. I will schedule a cystoscopy with urodynamic studies. She most probably will benefit from an alpha agent and a twice a week sulfa drugs. Urinalysis and urine cytology are negative. Renal colic CAT scan identifies a 5 mm upper pole stone and a 4.7 cm right lower pole stable angiomyolipoma. We will obtain an IVP and discuss ESWL when she returns for her cystoscopy.     07/13/20, pulmonary embolism, hospital admission, began ELIQUIS, sees Dr. Gina MACIAS     10/05/20, cystoscopy with urodynamics performed. Poor flow rate of 1 cc/s with a large volume of 490 cc. Cystoscopy reveals caked on debris within the trigone and floating debris throughout. IVP shows either a 9 mm stone in the left kidney or a composition of multiple 2 mm stones. She has intermittent pain. However in light of her recently begun ELIQUIS for pulmonary embolism, we will continue to observe before proceeding with any surgical intervention. I will begin her on Flomax daily and Bactrim twice a week for her recurrent UTIs and poor emptying as seen on the urodynamics. She will return in 3 months.     May 3, 2021, cystoscopy today shows significant improvement. Much less debris and virtually no erythema. Interestingly, the patient has discontinued Flomax and Bactrim. She retired last year and has begun a much healthier diet with virtually only water as her liquid intake. We will continue to follow and I will not recommend another cystoscopy unless her condition changes. We will see her again in August with her yearly laboratory testing. Surgery for stone disease is limited due to her history of pulmonary emboli.      August 14, 2021, patient arrives alone. She has no new complaints. Urine culture did show enteric bacilli and we will treat with Bactrim, cystoscopy, cystoscopy, left proximal stone which is unchanged. Angiomyolipoma in the right kidney is also unchanged. Urine cytology is negative, urinalysis 3 red blood cells she will continue on allopurinol 300 mg daily. He has discontinued Eliquis at this time and is being treated by Dr. Ross for deep vein thrombosis.     August 15, 2021, telephone call, urine cultures positive for E. coli, treated with Bactrim     August 1, 2023, patient arrives alone. She has no new urologic complaints. Recently, on July 19 she had angiogram for possible pulmonary embolus which was negative. She is now undergoing a cardiac workup for chest pain. CAT scan without contrast of the abdomen and pelvis shows no significant change in the right angiomyolipoma. Unchanged calcifications in the left kidney. Creatinine is 1.1. Urine cytology is negative for malignant cells. Urine culture no growth and urinalysis no blood. We will continue to follow and play close attention to her other comorbidities. We will not proceed with surgical intervention of her stones unless she is symptomatic. She also is concerned regarding allopurinol as it may affect her cardiac situation as she has read in the package insert. She will discuss this with her cardiologist.     October 2023, CABG x 4 at the [facility]     August 6, 2024, patient arrives alone.  She has no new urologic complaints.  Urine studies were not completed.  Renal colic CAT scan shows a stable 10 mm nonobstructing calculus in the midpole of the left kidney.  Also an angiomyolipoma of 4.9 cm in the right kidney which is stable.  She will return in 1 year.  She is a poor surgical candidate due to her cardiac episodes and multiple pulmonary emboli when she is off of anticoagulants.  Continue allopurinol 300 mg p.o. daily.    August 4, 2025, patient  "arrives alone.  Once again she has no new urologic complaints.  For the second year in a row she did not obtain her urine studies as ordered.  Renal colic CAT scan reveals a 7 mm nonobstructing stone in the midpole of the left kidney which is unchanged.  It also identified mild wall thickening and minimal pericolonic fat stranding involving the proximal ascending colon.  Recommendation is for evaluation.  The patient states that she had a colonoscopy in 2023 at the [facility] which was negative.  I suggested a GI consult OR one of our colorectal surgeons in consultation.  She states \"I am good\".  She does not want any further evaluation.  She also is considering discontinuing her allopurinol since she has not made any new stones.  I did stress the importance of maintaining the medication and a follow-up for the colonic finding but deferred to her strong conviction.  Will see her again in a year     PLAN:     #1 allopurinol 300 mg by mouth daily     #2 full evaluations with renal colic CAT scan, urine studies every August     3. LIMITED SURGERY for stone disease unless symptomatic due to a history of pulmonary emboli.      This note was created with voice-recognition software and was not corrected for typographical or grammatical errors.  "

## 2025-08-05 ENCOUNTER — APPOINTMENT (OUTPATIENT)
Dept: LAB | Facility: HOSPITAL | Age: 70
End: 2025-08-05
Payer: MEDICARE

## 2025-08-06 LAB
LABORATORY COMMENT REPORT: NORMAL
LABORATORY COMMENT REPORT: NORMAL
PATH REPORT.FINAL DX SPEC: NORMAL
PATH REPORT.GROSS SPEC: NORMAL
PATH REPORT.RELEVANT HX SPEC: NORMAL
PATH REPORT.TOTAL CANCER: NORMAL

## 2025-08-08 LAB
APPEARANCE UR: CLEAR
BACTERIA UR CULT: ABNORMAL
BILIRUB UR QL STRIP: NEGATIVE
COLOR UR: YELLOW
GLUCOSE UR QL STRIP: NEGATIVE
HGB UR QL STRIP: NEGATIVE
KETONES UR QL STRIP: NEGATIVE
LEUKOCYTE ESTERASE UR QL STRIP: NEGATIVE
NITRITE UR QL STRIP: NEGATIVE
PH UR STRIP: 5.5 [PH] (ref 5–8)
PROT UR QL STRIP: NEGATIVE
SP GR UR STRIP: 1.02 (ref 1–1.03)

## 2025-08-09 DIAGNOSIS — N39.0 URINARY TRACT INFECTION WITHOUT HEMATURIA, SITE UNSPECIFIED: ICD-10-CM

## 2025-08-09 RX ORDER — NITROFURANTOIN 25; 75 MG/1; MG/1
100 CAPSULE ORAL EVERY 12 HOURS
Qty: 6 CAPSULE | Refills: 0 | Status: SHIPPED | OUTPATIENT
Start: 2025-08-09 | End: 2025-08-12

## 2026-08-04 ENCOUNTER — APPOINTMENT (OUTPATIENT)
Dept: UROLOGY | Facility: CLINIC | Age: 71
End: 2026-08-04
Payer: MEDICARE